# Patient Record
Sex: FEMALE | Race: WHITE | Employment: OTHER | URBAN - METROPOLITAN AREA
[De-identification: names, ages, dates, MRNs, and addresses within clinical notes are randomized per-mention and may not be internally consistent; named-entity substitution may affect disease eponyms.]

---

## 2017-01-04 ENCOUNTER — HOSPITAL ENCOUNTER (OUTPATIENT)
Dept: PHYSICAL THERAPY | Facility: MEDICAL CENTER | Age: 72
Discharge: HOME | End: 2017-01-31
Admitting: PHYSICAL MEDICINE & REHABILITATION

## 2017-02-08 ENCOUNTER — OFFICE VISIT (OUTPATIENT)
Dept: ORTHOPEDIC SURGERY | Age: 72
End: 2017-02-08

## 2017-02-08 VITALS — WEIGHT: 125.18 LBS | HEIGHT: 62 IN | BODY MASS INDEX: 23.04 KG/M2

## 2017-02-08 DIAGNOSIS — M25.511 BILATERAL SHOULDER PAIN, UNSPECIFIED CHRONICITY: ICD-10-CM

## 2017-02-08 DIAGNOSIS — M53.3 SI (SACROILIAC) JOINT DYSFUNCTION: Primary | ICD-10-CM

## 2017-02-08 DIAGNOSIS — M25.512 BILATERAL SHOULDER PAIN, UNSPECIFIED CHRONICITY: ICD-10-CM

## 2017-02-08 PROCEDURE — 99213 OFFICE O/P EST LOW 20 MIN: CPT | Performed by: PHYSICAL MEDICINE & REHABILITATION

## 2017-04-10 ENCOUNTER — OFFICE VISIT (OUTPATIENT)
Dept: ORTHOPEDIC SURGERY | Age: 72
End: 2017-04-10

## 2017-04-10 VITALS — WEIGHT: 127 LBS | HEIGHT: 62 IN | BODY MASS INDEX: 23.37 KG/M2

## 2017-04-10 DIAGNOSIS — M79.672 LEFT FOOT PAIN: Primary | ICD-10-CM

## 2017-04-10 DIAGNOSIS — M76.72 PERONEAL TENDINITIS, LEFT: ICD-10-CM

## 2017-04-10 PROBLEM — M76.70 PERONEAL TENDINITIS: Status: ACTIVE | Noted: 2017-04-10

## 2017-04-10 PROCEDURE — 73630 X-RAY EXAM OF FOOT: CPT | Performed by: PODIATRIST

## 2017-04-10 PROCEDURE — 99214 OFFICE O/P EST MOD 30 MIN: CPT | Performed by: PODIATRIST

## 2017-04-10 PROCEDURE — L3040 FT ARCH SUPRT PREMOLD LONGIT: HCPCS | Performed by: PODIATRIST

## 2017-05-01 ENCOUNTER — OFFICE VISIT (OUTPATIENT)
Dept: ORTHOPEDIC SURGERY | Age: 72
End: 2017-05-01

## 2017-05-01 VITALS
SYSTOLIC BLOOD PRESSURE: 103 MMHG | WEIGHT: 126.98 LBS | HEART RATE: 74 BPM | BODY MASS INDEX: 23.37 KG/M2 | HEIGHT: 62 IN | DIASTOLIC BLOOD PRESSURE: 60 MMHG

## 2017-05-01 DIAGNOSIS — M76.72 PERONEAL TENDINITIS, LEFT: Primary | ICD-10-CM

## 2017-05-01 PROCEDURE — 99213 OFFICE O/P EST LOW 20 MIN: CPT | Performed by: PODIATRIST

## 2017-05-01 RX ORDER — RALOXIFENE HYDROCHLORIDE 60 MG/1
TABLET, FILM COATED ORAL
COMMUNITY
Start: 2017-04-10

## 2017-05-01 RX ORDER — DEXAMETHASONE SODIUM PHOSPHATE 4 MG/ML
INJECTION, SOLUTION INTRA-ARTICULAR; INTRALESIONAL; INTRAMUSCULAR; INTRAVENOUS; SOFT TISSUE
Qty: 30 ML | Refills: 0 | Status: SHIPPED | OUTPATIENT
Start: 2017-05-01 | End: 2018-11-21 | Stop reason: ALTCHOICE

## 2017-05-05 ENCOUNTER — HOSPITAL ENCOUNTER (OUTPATIENT)
Dept: PHYSICAL THERAPY | Facility: MEDICAL CENTER | Age: 72
Discharge: HOME | End: 2017-05-31
Admitting: PODIATRIST

## 2017-06-05 ENCOUNTER — OFFICE VISIT (OUTPATIENT)
Dept: ORTHOPEDIC SURGERY | Age: 72
End: 2017-06-05

## 2017-06-05 VITALS
WEIGHT: 126.98 LBS | DIASTOLIC BLOOD PRESSURE: 64 MMHG | HEIGHT: 62 IN | SYSTOLIC BLOOD PRESSURE: 108 MMHG | BODY MASS INDEX: 23.37 KG/M2 | HEART RATE: 70 BPM

## 2017-06-05 DIAGNOSIS — M76.72 PERONEAL TENDINITIS, LEFT: Primary | ICD-10-CM

## 2017-06-05 PROCEDURE — 99213 OFFICE O/P EST LOW 20 MIN: CPT | Performed by: PODIATRIST

## 2018-06-04 ENCOUNTER — OFFICE VISIT (OUTPATIENT)
Dept: ORTHOPEDIC SURGERY | Age: 73
End: 2018-06-04

## 2018-06-04 VITALS — HEIGHT: 63 IN | BODY MASS INDEX: 21.97 KG/M2 | WEIGHT: 124 LBS

## 2018-06-04 DIAGNOSIS — M72.0 DUPUYTREN'S CONTRACTURE: ICD-10-CM

## 2018-06-04 DIAGNOSIS — M79.645 FINGER PAIN, LEFT: Primary | ICD-10-CM

## 2018-06-04 DIAGNOSIS — R22.32 FINGER MASS, LEFT: ICD-10-CM

## 2018-06-04 PROCEDURE — 99213 OFFICE O/P EST LOW 20 MIN: CPT | Performed by: ORTHOPAEDIC SURGERY

## 2018-06-04 RX ORDER — DOXYCYCLINE HYCLATE 20 MG
20 TABLET ORAL 2 TIMES DAILY
COMMUNITY
End: 2020-12-17 | Stop reason: ALTCHOICE

## 2018-07-13 ENCOUNTER — TELEPHONE (OUTPATIENT)
Dept: ORTHOPEDIC SURGERY | Age: 73
End: 2018-07-13

## 2018-07-13 NOTE — TELEPHONE ENCOUNTER
Auth: NPR  Date: 7/26/18  Reference # None  Type of SX: Outpatient  Location: [facility]  CPT [id number]   SX area:  hand

## 2018-08-09 ENCOUNTER — OFFICE VISIT (OUTPATIENT)
Dept: ORTHOPEDIC SURGERY | Age: 73
End: 2018-08-09

## 2018-08-09 DIAGNOSIS — R22.32 FINGER MASS, LEFT: Primary | ICD-10-CM

## 2018-08-09 DIAGNOSIS — M72.0 DUPUYTREN'S CONTRACTURE: ICD-10-CM

## 2018-08-09 PROCEDURE — 99213 OFFICE O/P EST LOW 20 MIN: CPT | Performed by: ORTHOPAEDIC SURGERY

## 2018-08-09 NOTE — PROGRESS NOTES
Assessment: Dupuytren's contracture of the contralateral right hand which is going to need a Xiaflex injection for MP contracture as well as Y cord to ring and small fingers. The majority of her contracture though is about a 15° MP contracture of the ring finger. Treatment Plan: Informed consent for Xiaflex injection was obtained today. The insurance company has authorized the injection and she is going to wait till a more appropriate time for doing it. We also removed her sutures from her contralateral hand    Return for Xiaflex injection. History of Present Illness  Rosita Vasquez is a 81 y.o. female. Susanna Muniz presents today for scheduling for Xiaflex injection and informed consent. She also is postop from her contralateral left hand removal of a fibrous mass at the DIP joint level    Review of Systems  Pertinent items are noted in HPI  Complete Review of Systems reviewed from patient history form dated 2018 and available in the patients chart under the media tab. Vital Signs  There were no vitals filed for this visit. There is no height or weight on file to calculate BMI. Physical Exam  Constitutional:  Patient is well-nourished and demonstrates normal hygiene. Mental Status:  Patient is alert and oriented to person, place and time. Skin:  Intact, no rashes or lesions. Hand Examination: Left hand well-healed incision. Right hand pretendinous cord to the ring finger with 15° MP contracture 1 cords going to ring and small fingers.   When she extends her fingers fully she can't extend her wrist completely due to the palmar contracture    Additional Comments:     Additional Examinations:  X-Ray Findings:    Additional Diagnostic Test Findings:    Office Procedures: Risks and benefits of Xiaflex injection were discussed today including the remote possibility of tendon rupture, the incidence of skin tears, the incidence of lymphadenopathy, hematoma and swelling as well as recurrence of Dupuytren's    No orders of the defined types were placed in this encounter.

## 2018-08-29 ENCOUNTER — OFFICE VISIT (OUTPATIENT)
Dept: ORTHOPEDIC SURGERY | Age: 73
End: 2018-08-29

## 2018-08-29 VITALS — WEIGHT: 123 LBS | HEIGHT: 62 IN | BODY MASS INDEX: 22.63 KG/M2

## 2018-08-29 DIAGNOSIS — M72.0 DUPUYTREN'S CONTRACTURE: Primary | ICD-10-CM

## 2018-08-29 PROCEDURE — 20527 NJX NZM PALMAR FASCIAL CORD: CPT | Performed by: ORTHOPAEDIC SURGERY

## 2018-08-29 NOTE — PROGRESS NOTES
Injection adminstration details:  Date & Time: 8/29/18 10:11 AM  Site & Comments:Right ring finger Xiaflex injection administered by Dr. Meliza Matt 0.9mg per vial  NDC:  86672-423-58  Lot Number: [id number]  EXP: 07/2020    Medication supplied by [facility]- purchased at [employer]

## 2018-08-29 NOTE — PROGRESS NOTES
Assessment: Xiaflex injection. Majority of the injection was given into the Y cord at the ring finger MP joint but there is a Y cord to the middle finger which was injected as well. We then gave the remainder of the injection more proximally where she has a fairly substantial pretendinous cord to the ring finger. Total of 0.3 mL was injected    Treatment Plan: We discussed prior to the injection wrist and benefits of Xiaflex injection including lymphadenopathy swelling hematoma and skin tears. We also talked about the recurrence of Dupuytren's    No Follow-up on file. History of Present Illness  Fiona Cummings is a 81 y.o. female. Patient comes in today for Xiaflex injection    Review of Systems  Pertinent items are noted in HPI  Denies fever chills, confusion and bowel and bladder active change  Complete Review of Systems reviewed from patient history form dated 2018 and available in the patients chart under the media tab. Vital Signs  Vitals:    08/29/18 0949   Weight: 123 lb (55.8 kg)   Height: 5' 2.25\" (1.581 m)     Body mass index is 22.32 kg/m². Medical History  Past Medical History:   Diagnosis Date    Dizziness     Joint pain      Current Outpatient Prescriptions on File Prior to Visit   Medication Sig Dispense Refill    doxycycline hyclate (PERIOSTAT) 20 MG tablet Take 20 mg by mouth 2 times daily      raloxifene (EVISTA) 60 MG tablet       dexamethasone (DECADRON) 4 MG/ML injection Apply via iontophoresis with physical therapy 30 mL 0    Pediatric Multivitamins-Fl (MULTI VIT/FL PO) Take  by mouth.  aspirin 81 MG chewable tablet Take 81 mg by mouth daily.  CycloSPORINE (RESTASIS OP) Apply  to eye.  ValACYclovir HCl (VALTREX PO) Take  by mouth.  Raloxifene HCl (EVISTA PO) Take  by mouth. No current facility-administered medications on file prior to visit.       No Known Allergies  [unfilled]    Family History   Problem Relation Age of Onset    Heart Disease Father     Cancer Sister     Diabetes Paternal Aunt     Heart Disease Maternal Grandfather     Diabetes Paternal Grandfather     Heart Disease Paternal Grandfather        Physical Exam  Constitutional:  Patient is well-nourished and demonstrates normal hygiene. Mental Status:  Patient is alert and oriented to person, place and time. Skin:  Intact, no rashes or lesions.     Hand Examination: Prior to injection she has a pretendinous cord to the ring finger causing about a 10-15° MP contracture of the ring finger and about a 5° contracture of the middle finger    Additional Comments:     Additional Examinations:  X-Ray Findings:    Additional Diagnostic Test Findings:    Office Procedures: After discussing the procedure with the patient we sterilely prepped the right hand I then injected 0.2 mL of Xiaflex into the Y cord and 0.1 mL into the pretendinous cord proximally at the ring finger level        Orders Placed This Encounter   Procedures    MA INJECTION ENZYME PALMAR FASCIAL CORD     Xiaflex injection Right ring finger

## 2018-08-30 ENCOUNTER — OFFICE VISIT (OUTPATIENT)
Dept: ORTHOPEDIC SURGERY | Age: 73
End: 2018-08-30

## 2018-08-30 DIAGNOSIS — M72.0 DUPUYTREN'S CONTRACTURE: Primary | ICD-10-CM

## 2018-08-30 PROCEDURE — 26341 MANIPULAT PALM CORD POST INJ: CPT | Performed by: ORTHOPAEDIC SURGERY

## 2018-08-30 RX ORDER — HYDROCODONE BITARTRATE AND ACETAMINOPHEN 5; 325 MG/1; MG/1
1 TABLET ORAL EVERY 6 HOURS PRN
Qty: 10 TABLET | Refills: 0 | Status: SHIPPED | OUTPATIENT
Start: 2018-08-30 | End: 2018-09-02

## 2018-08-30 NOTE — PROGRESS NOTES
Physical Exam  Constitutional:  Patient is well-nourished and demonstrates normal hygiene. Mental Status:  Patient is alert and oriented to person, place and time. Skin:  Intact, no rashes or lesions. Hand Examination: Prior to the [place] she had a 10-15° MP contracture of the ring finger and about a 5-10° MP contracture of the middle finger. After Xiaflex manipulation we had full hyperextension with no restriction of all digits    Additional Comments:     Additional Examinations:  X-Ray Findings:    Additional Diagnostic Test Findings:    Office Procedures: After discussing the procedure with the patient again today I anesthetized the hand using a total of 10 mL of a 50% mixture of betamethasone and Xylocaine. We then manipulated the Xiaflex cords and were able to rupture all cords giving her full extension. No skin tears occurred there was one small blood blister distally she only has moderate swelling    I spent 15 minutes, face to face, with the patient discussing treatment options and answering questions regarding . ..     Orders Placed This Encounter   Procedures    [facility] [place] [facility]     Referral Priority:   Routine     Referral Type:   Eval and Treat     Referral Reason:   Specialty Services Required     Requested Specialty:   Occupational Therapy     Number of Visits Requested:   1    NE MANIPLATN PALAR FASCIAL CRD POST INJ SINGLE CORD

## 2018-09-20 ENCOUNTER — OFFICE VISIT (OUTPATIENT)
Dept: ORTHOPEDIC SURGERY | Age: 73
End: 2018-09-20

## 2018-09-20 VITALS — BODY MASS INDEX: 21.97 KG/M2 | WEIGHT: 124 LBS | HEIGHT: 63 IN

## 2018-09-20 DIAGNOSIS — M72.0 DUPUYTREN'S CONTRACTURE: Primary | ICD-10-CM

## 2018-09-20 PROCEDURE — 99212 OFFICE O/P EST SF 10 MIN: CPT | Performed by: ORTHOPAEDIC SURGERY

## 2018-09-20 NOTE — PROGRESS NOTES
Assessment: Excellent result from Xiaflex injection of the right ring finger with full extension    Treatment Plan: We discussed doing scar massage at home continued night splinting. Return if symptoms worsen or fail to improve. History of Present Illness  Flori Armas is a 81 y.o. female. Noah Avila is doing very well after Xiaflex rupture of the flexion contracture the right ring finger. She did have a lot of swelling and pain initially after her injection but now is doing well    Review of Systems  Pertinent items are noted in HPI  Denies fever chills, confusion and bowel and bladder active change  Complete Review of Systems reviewed from patient history form dated 2018 and available in the patients chart under the media tab. Vital Signs  Vitals:    09/20/18 0959   Weight: 124 lb (56.2 kg)   Height: 5' 2.75\" (1.594 m)     Body mass index is 22.14 kg/m². Medical History  Past Medical History:   Diagnosis Date    Dizziness     Joint pain      Current Outpatient Prescriptions on File Prior to Visit   Medication Sig Dispense Refill    collagenase (XIAFLEX) 0.9 MG SOLR injection 0.9 mg by Intra-Lesional route once for 1 dose 0.9 mg 0    doxycycline hyclate (PERIOSTAT) 20 MG tablet Take 20 mg by mouth 2 times daily      raloxifene (EVISTA) 60 MG tablet       dexamethasone (DECADRON) 4 MG/ML injection Apply via iontophoresis with physical therapy 30 mL 0    Pediatric Multivitamins-Fl (MULTI VIT/FL PO) Take  by mouth.  aspirin 81 MG chewable tablet Take 81 mg by mouth daily.  CycloSPORINE (RESTASIS OP) Apply  to eye.  ValACYclovir HCl (VALTREX PO) Take  by mouth.  Raloxifene HCl (EVISTA PO) Take  by mouth. No current facility-administered medications on file prior to visit.       No Known Allergies  [unfilled]  Family History   Problem Relation Age of Onset    Heart Disease Father     Cancer Sister     Diabetes Paternal Aunt     Heart Disease Maternal Grandfather

## 2018-11-21 ENCOUNTER — OFFICE VISIT (OUTPATIENT)
Dept: ORTHOPEDIC SURGERY | Age: 73
End: 2018-11-21
Payer: MEDICARE

## 2018-11-21 VITALS — WEIGHT: 123.9 LBS | BODY MASS INDEX: 21.95 KG/M2 | HEIGHT: 63 IN

## 2018-11-21 DIAGNOSIS — M25.562 LEFT KNEE PAIN, UNSPECIFIED CHRONICITY: Primary | ICD-10-CM

## 2018-11-21 DIAGNOSIS — M17.0 ARTHRITIS OF BOTH KNEES: ICD-10-CM

## 2018-11-21 DIAGNOSIS — M25.561 RIGHT KNEE PAIN, UNSPECIFIED CHRONICITY: ICD-10-CM

## 2018-11-21 PROCEDURE — 99214 OFFICE O/P EST MOD 30 MIN: CPT | Performed by: ORTHOPAEDIC SURGERY

## 2019-02-25 ENCOUNTER — OFFICE VISIT (OUTPATIENT)
Dept: ORTHOPEDIC SURGERY | Age: 74
End: 2019-02-25
Payer: MEDICARE

## 2019-02-25 VITALS — HEIGHT: 63 IN | BODY MASS INDEX: 21.95 KG/M2 | WEIGHT: 123.9 LBS

## 2019-02-25 DIAGNOSIS — M25.521 RIGHT ELBOW PAIN: Primary | ICD-10-CM

## 2019-02-25 DIAGNOSIS — M77.11 RIGHT LATERAL EPICONDYLITIS: ICD-10-CM

## 2019-02-25 PROCEDURE — MISCD85 PADDED ELBOW SLEEVE-BREG: Performed by: ORTHOPAEDIC SURGERY

## 2019-02-25 PROCEDURE — 99213 OFFICE O/P EST LOW 20 MIN: CPT | Performed by: ORTHOPAEDIC SURGERY

## 2019-03-04 ENCOUNTER — APPOINTMENT (OUTPATIENT)
Dept: OCCUPATIONAL THERAPY | Age: 74
End: 2019-03-04
Payer: MEDICARE

## 2019-03-05 ENCOUNTER — HOSPITAL ENCOUNTER (OUTPATIENT)
Dept: OCCUPATIONAL THERAPY | Age: 74
Setting detail: THERAPIES SERIES
Discharge: HOME | End: 2019-03-05
Payer: MEDICARE

## 2019-03-05 PROCEDURE — G8984 CARRY CURRENT STATUS: HCPCS | Performed by: OCCUPATIONAL THERAPIST

## 2019-03-05 PROCEDURE — G8985 CARRY GOAL STATUS: HCPCS | Performed by: OCCUPATIONAL THERAPIST

## 2019-03-05 PROCEDURE — 97165 OT EVAL LOW COMPLEX 30 MIN: CPT | Performed by: OCCUPATIONAL THERAPIST

## 2019-03-05 PROCEDURE — 97535 SELF CARE MNGMENT TRAINING: CPT | Performed by: OCCUPATIONAL THERAPIST

## 2019-03-05 PROCEDURE — 97140 MANUAL THERAPY 1/> REGIONS: CPT | Performed by: OCCUPATIONAL THERAPIST

## 2019-03-05 PROCEDURE — 97110 THERAPEUTIC EXERCISES: CPT | Performed by: OCCUPATIONAL THERAPIST

## 2019-03-20 ENCOUNTER — HOSPITAL ENCOUNTER (OUTPATIENT)
Dept: OCCUPATIONAL THERAPY | Age: 74
Setting detail: THERAPIES SERIES
Discharge: HOME | End: 2019-03-20
Payer: MEDICARE

## 2019-03-20 PROCEDURE — 97140 MANUAL THERAPY 1/> REGIONS: CPT | Performed by: OCCUPATIONAL THERAPIST

## 2019-03-20 PROCEDURE — 97110 THERAPEUTIC EXERCISES: CPT | Performed by: OCCUPATIONAL THERAPIST

## 2019-03-20 PROCEDURE — 97035 APP MDLTY 1+ULTRASOUND EA 15: CPT | Performed by: OCCUPATIONAL THERAPIST

## 2019-03-20 PROCEDURE — 97112 NEUROMUSCULAR REEDUCATION: CPT | Performed by: OCCUPATIONAL THERAPIST

## 2019-03-28 ENCOUNTER — HOSPITAL ENCOUNTER (OUTPATIENT)
Dept: OCCUPATIONAL THERAPY | Age: 74
Setting detail: THERAPIES SERIES
Discharge: HOME | End: 2019-03-28
Payer: MEDICARE

## 2019-03-28 PROCEDURE — 97110 THERAPEUTIC EXERCISES: CPT | Performed by: OCCUPATIONAL THERAPIST

## 2019-03-28 PROCEDURE — 97035 APP MDLTY 1+ULTRASOUND EA 15: CPT | Performed by: OCCUPATIONAL THERAPIST

## 2019-03-28 PROCEDURE — 97140 MANUAL THERAPY 1/> REGIONS: CPT | Performed by: OCCUPATIONAL THERAPIST

## 2019-04-03 ENCOUNTER — OFFICE VISIT (OUTPATIENT)
Dept: ORTHOPEDIC SURGERY | Age: 74
End: 2019-04-03
Payer: MEDICARE

## 2019-04-03 VITALS
WEIGHT: 123.9 LBS | BODY MASS INDEX: 21.95 KG/M2 | HEIGHT: 63 IN | SYSTOLIC BLOOD PRESSURE: 122 MMHG | DIASTOLIC BLOOD PRESSURE: 61 MMHG | HEART RATE: 65 BPM

## 2019-04-03 DIAGNOSIS — M54.50 ACUTE LOW BACK PAIN WITHOUT SCIATICA, UNSPECIFIED BACK PAIN LATERALITY: Primary | ICD-10-CM

## 2019-04-03 DIAGNOSIS — M51.36 DDD (DEGENERATIVE DISC DISEASE), LUMBAR: ICD-10-CM

## 2019-04-03 PROCEDURE — 99214 OFFICE O/P EST MOD 30 MIN: CPT | Performed by: PHYSICAL MEDICINE & REHABILITATION

## 2019-04-03 NOTE — PROGRESS NOTES
tenderness bilaterally at the paraspinal or trochanters. There is no step-off or paraspinal spasm. · Range of Motion:  95% normal flexion extension  · Strength:   Strength testing is 5/5 in all muscle groups tested. · Special Tests:   Straight leg raise and crossed SLR negative. Leg length and pelvis level.  0 out of 5 Mayelin's signs. · Skin: There are no rashes, ulcerations or lesions. · Reflexes: Reflexes are symmetrically 2+ at the patellar and ankle tendons. Clonus absent bilaterally at the feet. · Gait & station: Normal gait  · Additional Examinations:   · RIGHT LOWER EXTREMITY: Inspection/examination of the right lower extremity does not show any tenderness, deformity or injury. Range of motion is full. There is no gross instability. There are no rashes, ulcerations or lesions. Strength and tone are normal.  ·   · LEFT LOWER EXTREMITY:  Inspection/examination of the left lower extremity does not show any tenderness, deformity or injury. Range of motion is full. There is no gross instability. There are no rashes, ulcerations or lesions. Strength and tone are normal.    Diagnostic Testin CBC in general chemistries are normal    April 3, 2019 2 views lumbar spine AP and lateral show DDD L5-S1 with L5-S1 facet arthropathy, overall unchanged from 2016 x-rays    Impression:    Subacute right mechanical back pain  Lumbar DDD and spondylosis  Osteopenia    Plan:     And think she has aggravation of lumbar DDD and spondylosis. She is already improving.     Recommending short course of physical therapy for core strengthening and ergonomics    SHARDA Chau

## 2019-04-04 ENCOUNTER — HOSPITAL ENCOUNTER (OUTPATIENT)
Dept: OCCUPATIONAL THERAPY | Age: 74
Setting detail: THERAPIES SERIES
Discharge: HOME | End: 2019-04-04
Payer: MEDICARE

## 2019-04-04 PROCEDURE — 97035 APP MDLTY 1+ULTRASOUND EA 15: CPT | Performed by: OCCUPATIONAL THERAPIST

## 2019-04-04 PROCEDURE — 97140 MANUAL THERAPY 1/> REGIONS: CPT | Performed by: OCCUPATIONAL THERAPIST

## 2019-04-04 PROCEDURE — 97110 THERAPEUTIC EXERCISES: CPT | Performed by: OCCUPATIONAL THERAPIST

## 2019-04-04 NOTE — FLOWSHEET NOTE
[facility] [address] [facility]   [address], [address]  Phone: [phone] Fax: [phone]      Hand Therapy Daily Treatment Note  Date:  2019    Patient: Yusef Andujar   : 1945   MRN: [id number]  Referring Physician: Referring Practitioner: Isaiah Pearson MD       Medical Diagnosis Information:  Diagnosis: M77.11 (ICD-10-CM) - Right lateral epicondylitis    Treatment Diagnosis: M25.521 (ICD-10-CM) - Right elbow pain                                         Insurance information: OT Insurance Information: [employer]  Date of Injury:NA  Date of Surgery:NA      Visit # Insurance Allowable   4 BMN     Date of Patient follow up with Physician: 19    G-Codes:  OT G-codes  Functional Assessment Tool Used: Quick DASH  Score: 48%  Functional Limitation: Carrying, moving and handling objects  Carrying, Moving and Handling Objects Current Status (): At least 40 percent but less than 60 percent impaired, limited or restricted  Carrying, Moving and Handling Objects Goal Status (): At least 20 percent but less than 40 percent impaired, limited or restricted    Progress Note: []  Yes  [x]  No  Next due by: Visit #10      Latex Allergy:  [x]NO      []YES            Pacemaker:  [x] No       [] Yes      Preferred Language for Healthcare:   [x]English       []other:    SUBJECTIVE: Pain continues to improve overall, however did feel more soreness over length of R UE after increased gardening/yardwork this weekend. Also reports having been seen by another physician recently due to back pain and referred for PT.      Background/Relevant Medical & Therapy History: progressive pain since last /winter 2018; possibly due to snow removal; reports increased pain with knitting, lifting, carrying, stretching arm, opening jars       Pain level:  0/10 at rest, 2-3/10 at times, \"mild\"      RESTRICTIONS/PRECAUTIONS:     OBJECTIVE:       Date: 3/5/2019 3/20/19 3/28/19 4/4/19    Objective Measures:        Wrist ext/flex            Rd/ud  FA sup/pron  Elbow ext/flex       65/80    80/85  0/140 (\"stiff\")   WNL     II  Lateral Pinch  3 Point Pinch  MMT: wrist ext   30 with pain  11  11  4+/5 with pain   20  Right - 50  Left -  R 52#      5/5 wrist ext/flex               Modalities:        US, cont, 1MHz, 1.2wcm2, lateral elbow/FA - 8' same 8'                    Therapeutic Exercise, Activities, NMR:        AROM 5x elbow AROM    Shoulder circles x 10     Shoulder/  scapular circles x 10 (cueing needed for technique)   Shoulder/  scapular circles x 10    Shoulder circles x 5    Composite Stretching 10x wrist ext, flex - shoulder down technique 10x2 (cueing for technique - passive stretching vs active performance) foreamrm stretching 10x2 composite stretching into power web    Conditioning Eccentric wrist ext x 10 (1#), concentric wrist flex x 10 (1#) 1#, 10x2 each as prior    Therabar, yellow, B sup/pron (neutral wrist, submax ) x 10 each, R pron x 10 2# eccentric wrist extension 10 x 2, concentric wrist flexion 10 x 2, 2 lbs. 2# eccentric wrist ext x 10, concentric wrist ext, flex x 10 each    Proximal scapular stabilization  10x \"supermans\" (modified position 90/90 due to hx of shoulder irritation)    10x each HAB thumbs up/down  Thoracic stabilization exercises in prone three positions x 15 each.  Instructed on B UE light resistive shoulder, scapular, elbow exercises (cueing for submaximal , neutral wrist)- 10x2 each B shoulder ext, triceps/elbow ext, biceps/elbow flex, rowing     ADL Retraining Instructed on diagnosis specific anatomy, joint protection, and ADL modifications - resource information provided Reviewed techniques; demonstrated alternative wrist/hand posturing for use of spray bottle & considerations for alternative sprayer/brand for increased ease  Reviewed lifting, holding techniques    Corner Stretch X 5 x5  x5 Therapeutic Exercise and NMR:  [x] (96645) Provided verbal/tactile cueing for activities related to strengthening, flexibility, endurance, ROM  for improvements in scapular, scapulothoracic and UE control with self care, reaching, carrying, lifting, house/yardwork, driving/computer work. [x] (48774) Provided verbal/tactile cueing for activities related to improving balance, coordination, kinesthetic sense, posture, motor skill, proprioception  to assist with  scapular, scapulothoracic and UE control with self care, reaching, carrying, lifting, house/yardwork, driving/computer work.   [] Comments:    Therapeutic Activities:    [] (61480 or 53015) Provided verbal/tactile cueing for activities related to improving balance, coordination, kinesthetic sense, posture, motor skill, proprioception and motor activation to allow for proper function of scapular, scapulothoracic and UE control with self care, carrying, lifting, driving/computer work  [] Comments:    Home Exercise Program:    [x] (41797) Reviewed/Progressed HEP activities related to strengthening, flexibility, endurance, ROM of scapular, scapulothoracic and UE control with self care, reaching, carrying, lifting, house/yardwork, driving/computer work  [x] (32964) Reviewed/Progressed HEP activities related to improving balance, coordination, kinesthetic sense, posture, motor skill, proprioception of scapular, scapulothoracic and UE control with self care, reaching, carrying, lifting, house/yardwork, driving/computer work    [x] Comments:instructed on strengthening sheet    Manual Treatments:  PROM / STM / Oscillations-Mobs:  G-I, II, III, IV (PA's, Inf., Post.)  [x] (57475) Provided manual therapy to mobilize soft tissue/joints of cervical/CT, scapular GHJ and UE for the purpose of modulating pain, promoting relaxation,  increasing ROM, reducing/eliminating soft tissue swelling/inflammation/restriction, improving soft tissue extensibility and allowing for proper ROM for normal function with self care, reaching, carrying, lifting, house/yardwork, driving/computer work  [x] Comments: 8' STM, DTM, CFM    ADL Training:  [x]  (96934) Provided self-care/home management training related to activities of daily living and compensatory training, and/or use of adaptive equipment   [x] Comments:reviewed diagnosis specific anatomy, joint protection, and ADL modifications     Splinting:  [] Fabrication of:   [] (19818) Orthotic/Prosthetic Management, subsequent encounter  [] (71264) Orthotic management and training (fitting and assessment)  [] Comments:    Charges:  Timed Code Treatment Minutes: 54   Total Treatment Minutes: 60     [] EVAL (LOW) 00555   [] OT Re-eval (98659)  [] EVAL (MOD) 99317   [] EVAL (HIGH) 12508       [x] Giselle (97810) x  2   [] RHBFN(39324)  [] NMR (67837) x      [] Estim (attended) (18266)   [x] Manual ([id number]) x  1    [x] US (88412)   [] TA (08954) x      [] Paraffin (93805)  [] ADL  ([phone]) x     [] Splint/L code:    [] Estim (unattended) (22 [id number])  [] Fluidotherapy (50494)  [] Other:    GOALS:  Short Term Goals: To be achieved in: 2 weeks  1. Independent in HEP and progression per patient tolerance, in order to prevent re-injury. 2. Patient will have a decrease in pain to facilitate improvement in movement, function, and ADLs as indicated by Functional Deficits.     Long Term Goals to be achieved in 4-6  weeks, including patient directed goals to address identified performance deficits:  1) Pt to be independent in graded HEP progression with a good level of effort and compliance. 2) Pt to report a score of 30 % or less on the Quick DASH disability questionnaire for increased performance with carrying, moving, and handling objects.   3) Pt will demonstrate increased painfree full AROM (0/140) R elbow for improved independence with reaching into cabinets  4) Pt will demonstrate increased strength to R  >/= 75% of L for increased independence with opening dequan. 5) Pt will have a decrease in pain to 1-2/10 to enable return to knitting. 6)    7)       Progression Towards Functional goals:  [x] Patient is progressing as expected towards functional goals listed. [] Progression is slowed due to complexities listed. [] Progression has been slowed due to co-morbidities. [] Plan just implemented, too soon to assess goals progression  [] All goals are met  [] Other:     ASSESSMENT:   Strength and function increasing, pain decreasing  Treatment/Activity Tolerance:  [x] Patient tolerated treatment well [] Patient limited by fatique  [] Patient limited by pain   [] Patient limited by other medical complications  [] Other:     Prognosis: [x] Good [] Fair  [] Poor    Patient Requires Follow-up: [x] Yes  [] No    PLAN: [x] Continue per plan of care [] Alter current plan (see comments)  [] Plan of care initiated [] Hold pending MD visit [] Discharge    If patient does not return for further follow ups after this date, please consider this as the patient's discharge from occupational therapy. Electronically signed by:  Andrew Gill OTR/L, PT, MPT, [address] [place] [address], [id number], [id number]

## 2019-04-10 ENCOUNTER — APPOINTMENT (OUTPATIENT)
Dept: OCCUPATIONAL THERAPY | Age: 74
End: 2019-04-10
Payer: MEDICARE

## 2019-04-10 ENCOUNTER — HOSPITAL ENCOUNTER (OUTPATIENT)
Dept: PHYSICAL THERAPY | Age: 74
Setting detail: THERAPIES SERIES
Discharge: HOME | End: 2019-04-10
Payer: MEDICARE

## 2019-04-10 ENCOUNTER — HOSPITAL ENCOUNTER (OUTPATIENT)
Dept: OCCUPATIONAL THERAPY | Age: 74
Setting detail: THERAPIES SERIES
Discharge: HOME | End: 2019-04-10
Payer: MEDICARE

## 2019-04-10 PROCEDURE — G8985 CARRY GOAL STATUS: HCPCS | Performed by: OCCUPATIONAL THERAPIST

## 2019-04-10 PROCEDURE — 97112 NEUROMUSCULAR REEDUCATION: CPT | Performed by: OCCUPATIONAL THERAPIST

## 2019-04-10 PROCEDURE — 97110 THERAPEUTIC EXERCISES: CPT | Performed by: PHYSICAL THERAPIST

## 2019-04-10 PROCEDURE — 97110 THERAPEUTIC EXERCISES: CPT | Performed by: OCCUPATIONAL THERAPIST

## 2019-04-10 PROCEDURE — G8984 CARRY CURRENT STATUS: HCPCS | Performed by: OCCUPATIONAL THERAPIST

## 2019-04-10 PROCEDURE — 97035 APP MDLTY 1+ULTRASOUND EA 15: CPT | Performed by: OCCUPATIONAL THERAPIST

## 2019-04-10 PROCEDURE — 97140 MANUAL THERAPY 1/> REGIONS: CPT | Performed by: OCCUPATIONAL THERAPIST

## 2019-04-10 PROCEDURE — 97161 PT EVAL LOW COMPLEX 20 MIN: CPT | Performed by: PHYSICAL THERAPIST

## 2019-04-10 NOTE — FLOWSHEET NOTE
[facility] [address] [place] [address] [facility]    [facility] Therapy Daily Treatment Note  Date:  4/10/2019    Patient Name:  Andrew Leos    :  1945  MRN: [id number]  Restrictions/Precautions:    Medical/Treatment Diagnosis Information:  · Diagnosis: Low back pain, lumbar DDD   M54.5  · Treatment Diagnosis: PT treatment diagnosis:  low back pain  Insurance/Certification information:  PT Insurance Information: [employer]  visits based on medical necessity,  $0 copay  Physician Information:  Referring Practitioner: Dr Alejandro Zepeda of care signed (Y/N):     Date of Patient follow up with Physician:     G-Code (if applicable):      Date G-Code Applied:    PT G-Codes  Functional Assessment Tool Used: Modified TOOTIE  Score: 8%    Progress Note: []  Yes  []  No  Next due by: Visit #10      Latex Allergy:  [x]NO      []YES  Preferred Language for Healthcare:   [x]English       []other:    Visit # Insurance Allowable   1 BMN       Auth Required   []  Yes    [] No    Visits Approved  Date Ranged-       Pain level:  0/10     SUBJECTIVE:  See eval    OBJECTIVE: See eval  Observation:   Test measurements:      RESTRICTIONS/PRECAUTIONS: Osteopenia    Exercises/Interventions:       Script:  19  Exercise/Equipment Sets/Reps Notes Last Progression   Hand Knee Rock      Prone Press Up      LTR w/ crossed legs 3x30'' B     Piriformis Cross Over/Figure 4 piriformis Stretch      SKC      DKC      Prone Quad Stretch       Hamstring Stretch 3x30'' B tableside    Quadruped UE      Quadruped LE      Quadruped UE/LE combined (birddog)       TA / Oly Ditch / Abd Hollow      TA March      Bridge 2x10     SLR Flex      SLR Abd 2x10 B     Prone hip ext 2x10 B     Clamshells      Prone plank      Side plank      Squats      Standing Stretch (insert muscle)                        Education 5'           Therapeutic Exercise and NMR EXR  [] (79494) Provided verbal/tactile cueing for activities related to strengthening, flexibility, endurance, ROM  for improvements in proximal hip and core control with self care, mobility, lifting and ambulation.  [] (59995) Provided verbal/tactile cueing for activities related to improving balance, coordination, kinesthetic sense, posture, motor skill, proprioception  to assist with core control in self care, mobility, lifting, and ambulation. Therapeutic Activities:    [] (73098 or 40893) Provided verbal/tactile cueing for activities related to improving balance, coordination, kinesthetic sense, posture, motor skill, proprioception and motor activation to allow for proper function  with self care and ADLs  [] (50785) Provided training and instruction to the patient for proper core and proximal hip recruitment and positioning with ambulation re-education     Home Exercise Program:    [x] (60066) Reviewed/Progressed HEP activities related to strengthening, flexibility, endurance, ROM of core, proximal hip and LE for functional self-care, mobility, lifting and ambulation   [] (41302) Reviewed/Progressed HEP activities related to improving balance, coordination, kinesthetic sense, posture, motor skill, proprioception of core, proximal hip and LE for self care, mobility, lifting, and ambulation      Manual Treatments: Traction / PA's (Gr I, II, III, IV) / Stretch- Hamstring, Piriformis, Hip Flexor, Groin / STM/ Sacral Decompression  [x] Provided manual therapy to mobilize soft tissue/joints for the purpose of modulating pain, promoting relaxation, increasing ROM, reducing/eliminating soft tissue swelling/inflammation/restriction, improving soft tissue extensibility and allowing for proper ROM for normal function. (01703).      Modalities:       Charges:   Timed Code Treatment Minutes: 25   Total Treatment Minutes: 50     [x] EVAL (LOW) 40963 (typically 20 minutes face-to-face)  [] EVAL (MOD) 30044 (typically 30 minutes face-to-face)  [] EVAL (HIGH) 93410 (typically 45 minutes face-to-face)  [] RE-EVAL     [x] SO(96700) x  2   [] IONTO  [] NMR (36890) x      [] VASO  [] Manual (43575) x       [] Other:  [] TA x       [] Mech Traction (20435)  [] ES(attended) (01987)      [] ES (un) (27168):     Goals:   Short Term Goals: To be achieved in: 2 weeks  1. Independent in HEP and progression per patient tolerance, in order to prevent re-injury. 2. Patient will have a decrease in pain to facilitate improvement in movement, function, and ADLs as indicated by Functional Deficits. Long Term Goals: To be achieved in: 4 weeks  1. Disability index score of 0% for the TOOTIE to assist with reaching prior level of function. 2. Patient will demonstrate increased AROM to WNL, good LS mobility, good hip ROM to allow for proper joint functioning as indicated by patients Functional Deficits. 3. Patient will demonstrate an increase in Strength to good proximal hip and core activation to allow for proper functional mobility as indicated by patients Functional Deficits. 4. Patient will return to bending forward to put on shoes, get in/out of car, functional activities without increased symptoms or restriction. Progression Towards Functional goals:  [] Patient is progressing as expected towards functional goals listed. [] Progression is slowed due to complexities listed. [] Progression has been slowed due to co-morbidities.   [x] Plan just implemented, too soon to assess goals progression  [] Other:     ASSESSMENT:  See eval    Treatment/Activity Tolerance:  [x] Patient tolerated treatment well [] Patient limited by fatique  [] Patient limited by pain  [] Patient limited by other medical complications  [] Other:     Prognosis: [x] Good [] Fair  [] Poor    Patient Requires Follow-up: [x] Yes  [] No    PLAN FOR NEXT SESSION:     PLAN: See eval  [] Continue per plan of care [] Alter current plan (see comments)  [x] Plan of care initiated [] Hold pending MD visit [] Discharge    *If patient does not return for further follow ups after this date. Please consider this as the patients discharge from physical therapy.      Electronically signed by: Jovita Aggarwal PT [id number]

## 2019-04-10 NOTE — FLOWSHEET NOTE
[facility]  [address], [address] [facility], [address] [facility]  Phone: [phone] Fax: [phone]      Hand Therapy Daily Treatment Note  Date:  4/10/2019    Patient: Carleton Aschoff   : 1945   MRN: [id number]  Referring Physician: Referring Practitioner: Jacob Christianson MD       Medical Diagnosis Information:  Diagnosis: M77.11 (ICD-10-CM) - Right lateral epicondylitis    Treatment Diagnosis: M25.521 (ICD-10-CM) - Right elbow pain                                         Insurance information: OT Insurance Information: [employer]  Date of Injury:NA  Date of Surgery:NA      Visit # Insurance Allowable   5 BMN     Date of Patient follow up with Physician: 19    G-Codes:  OT G-codes  Functional Assessment Tool Used: Quick DASH  Score: 16%  Functional Limitation: Carrying, moving and handling objects  Carrying, Moving and Handling Objects Current Status (): At least 1 percent but less than 20 percent impaired, limited or restricted  Carrying, Moving and Handling Objects Goal Status ():  At least 20 percent but less than 40 percent impaired, limited or restricted    Progress Note: []  Yes  [x]  No  Next due by: Visit #10      Latex Allergy:  [x]NO      []YES            Pacemaker:  [x] No       [] Yes      Preferred Language for Healthcare:   [x]English       []other:    SUBJECTIVE: overall continues to feel improvement; starting PT today for back pain; requesting review of exercise technique to ensure proper form     Background/Relevant Medical & Therapy History: progressive pain since last /winter 2018; possibly due to snow removal; reports increased pain with knitting, lifting, carrying, stretching arm, opening jars       Pain level:  0/10 at rest, 2-3/10 at times, \"mild\"      RESTRICTIONS/PRECAUTIONS:     OBJECTIVE:       Date:  3/5/2019 3/20/19 3/28/19 4/4/19 4/10/19   Objective Measures:        Wrist ext/flex Rd/ud  FA sup/pron  Elbow ext/flex       65/80    80/85  0/140 (\"stiff\")   WNL 70/80      5HE/140    II  Lateral Pinch  3 Point Pinch  MMT: wrist ext   30 with pain  11  11  4+/5 with pain   20  Right - 50  Left -  R 52#      5/5 wrist ext/flex    R 42       L 45  14     14  11     11  5/5 B scaption, shoulder flexion, elbow ext/flex, wrist flex/ext (minimal pain reported R wrist ext with wrist ext)        14   14  11   11   Modalities:        US, cont, 1MHz, 1.2wcm2, lateral elbow/FA - 8' same 8' 8'                   Therapeutic Exercise, Activities, NMR:        AROM 5x elbow AROM    Shoulder circles x 10     Shoulder/  scapular circles x 10 (cueing needed for technique)   Shoulder/  scapular circles x 10    Shoulder circles x 5 Shoulder circles x 5    Elbow AROM x 5       Composite Stretching 10x wrist ext, flex - shoulder down technique 10x2 (cueing for technique - passive stretching vs active performance) foreamrm stretching 10x2 composite stretching into power web 10x2   Conditioning Eccentric wrist ext x 10 (1#), concentric wrist flex x 10 (1#) 1#, 10x2 each as prior    Therabar, yellow, B sup/pron (neutral wrist, submax ) x 10 each, R pron x 10 2# eccentric wrist extension 10 x 2, concentric wrist flexion 10 x 2, 2 lbs. 2# eccentric wrist ext x 10, concentric wrist ext, flex x 10 each Therabar, yellow, red, green B sup/pron x 5 each; R sup/pron x 10 red - cueing for wrist position, submaximal gripping, proximal stabilization   Proximal scapular stabilization  10x \"supermans\" (modified position 90/90 due to hx of shoulder irritation)    10x each HAB thumbs up/down  Thoracic stabilization exercises in prone three positions x 15 each.  Instructed on B UE light resistive shoulder, scapular, elbow exercises (cueing for submaximal , neutral wrist)- 10x2 each B shoulder ext, triceps/elbow ext, biceps/elbow flex, rowing  Tband - B yellow, 12x each shoulder ext, elbow ext, rowing, elbow flex   ADL Retraining Instructed on diagnosis specific anatomy, joint protection, and ADL modifications - resource information provided Reviewed techniques; demonstrated alternative wrist/hand posturing for use of spray bottle & considerations for alternative sprayer/brand for increased ease  Reviewed lifting, holding techniques Reviewed   Corner Stretch X 5 x5  x5 x5                     Therapeutic Exercise and NMR:  [x] (66671) Provided verbal/tactile cueing for activities related to strengthening, flexibility, endurance, ROM  for improvements in scapular, scapulothoracic and UE control with self care, reaching, carrying, lifting, house/yardwork, driving/computer work. [x] (09674) Provided verbal/tactile cueing for activities related to improving balance, coordination, kinesthetic sense, posture, motor skill, proprioception  to assist with  scapular, scapulothoracic and UE control with self care, reaching, carrying, lifting, house/yardwork, driving/computer work.   [] Comments:    Therapeutic Activities:    [x] (82650 or 56786) Provided verbal/tactile cueing for activities related to improving balance, coordination, kinesthetic sense, posture, motor skill, proprioception and motor activation to allow for proper function of scapular, scapulothoracic and UE control with self care, carrying, lifting, driving/computer work  [] Comments:    Home Exercise Program:    [x] (90922) Reviewed/Progressed HEP activities related to strengthening, flexibility, endurance, ROM of scapular, scapulothoracic and UE control with self care, reaching, carrying, lifting, house/yardwork, driving/computer work  [x] (04832) Reviewed/Progressed HEP activities related to improving balance, coordination, kinesthetic sense, posture, motor skill, proprioception of scapular, scapulothoracic and UE control with self care, reaching, carrying, lifting, house/yardwork, driving/computer work    [] Comments:    Manual Treatments:  PROM / STM / Oscillations-Mobs:  G-I, II, III, IV (PA's, Inf., Post.)  [x] (19728) Provided manual therapy to mobilize soft tissue/joints of cervical/CT, scapular GHJ and UE for the purpose of modulating pain, promoting relaxation,  increasing ROM, reducing/eliminating soft tissue swelling/inflammation/restriction, improving soft tissue extensibility and allowing for proper ROM for normal function with self care, reaching, carrying, lifting, house/yardwork, driving/computer work  [x] Comments: 8' STM, DTM, CFM    ADL Training:  [x]  (47389) Provided self-care/home management training related to activities of daily living and compensatory training, and/or use of adaptive equipment   [x] Comments:reviewed diagnosis specific anatomy, joint protection, and ADL modifications     Splinting:  [] Fabrication of:   [] (93717) Orthotic/Prosthetic Management, subsequent encounter  [] (29502) Orthotic management and training (fitting and assessment)  [] Comments:    Charges:  Timed Code Treatment Minutes: 65   Total Treatment Minutes: 70     [] EVAL (LOW) 68706   [] OT Re-eval (68780)  [] EVAL (MOD) 76080   [] EVAL (HIGH) 08988       [x] Giselle (18396) x  1   [] PPAWC(64682)  [x] NMR (44876) x  1   [] Estim (attended) (54696)   [x] Manual ([id number]) x  1    [x] US (49873)   [] TA (17453) x      [] Paraffin (79949)  [] ADL  (54855) x     [] Splint/L code:    [] Estim (unattended) (53824)  [] Fluidotherapy (74284)  [] Other:    GOALS:  Short Term Goals: To be achieved in: 2 weeks  1. Independent in HEP and progression per patient tolerance, in order to prevent re-injury. 2. Patient will have a decrease in pain to facilitate improvement in movement, function, and ADLs as indicated by Functional Deficits.     Long Term Goals to be achieved in 4-6  weeks, including patient directed goals to address identified performance deficits:  1) Pt to be independent in graded HEP progression with a good level of effort and compliance.   2) Pt to report a score of 30 % or less on the Quick DASH disability questionnaire for increased performance with carrying, moving, and handling objects. Met 4/11/2019  3) Pt will demonstrate increased painfree full AROM (0/140) R elbow for improved independence with reaching into cabinets. Met 4/11/2019  4) Pt will demonstrate increased strength to R  >/= 75% of L for increased independence with opening jars. Met 4/11/2019  5) Pt will have a decrease in pain to 1-2/10 to enable return to knitting. 6)    7)       Progression Towards Functional goals:  [x] Patient is progressing as expected towards functional goals listed. [] Progression is slowed due to complexities listed. [] Progression has been slowed due to co-morbidities. [] Plan just implemented, too soon to assess goals progression  [] All goals are met  [x] Other: goals 2-4 met     ASSESSMENT:   Functional rom, strength progressing nicely, pain decreasing; reinforcement needed for exercise technique, activity modifications  Treatment/Activity Tolerance:  [x] Patient tolerated treatment well [] Patient limited by fatique  [] Patient limited by pain   [] Patient limited by other medical complications  [] Other:     Prognosis: [x] Good [] Fair  [] Poor    Patient Requires Follow-up: [x] Yes  [] No    PLAN: [x] Continue per plan of care [] Alter current plan (see comments)  [] Plan of care initiated [] Hold pending MD visit [] Discharge    If patient does not return for further follow ups after this date, please consider this as the patient's discharge from occupational therapy. Electronically signed by:  Andrew Gill OTR/L, PT, MPT, [address] [place] [address], [phone], [phone]

## 2019-04-10 NOTE — PLAN OF CARE
The [address] [facility] and [address] [facility]  [address], [place], [address] [facility]  Phone: [phone]   Fax:     [phone]                                                       Cynthia Holt    Dear  Referring Practitioner: Dr Elizabeth Jessica,    We had the pleasure of evaluating the following patient for physical therapy services at [address] [place] [address]. A summary of our findings can be found in the initial assessment below. This includes our plan of care. If you have any questions or concerns regarding these findings, please do not hesitate to contact me at the office phone number checked above. Thank you for the referral.       Physician Signature:_______________________________Date:__________________  By signing above (or electronic signature), therapists plan is approved by physician      Patient: Cdoi Wood   : 1945   MRN: [id number]  Referring Physician: Referring Practitioner: Dr Elizabeth Jessica      Evaluation Date: 4/10/2019      Medical Diagnosis Information:  Diagnosis: Low back pain, lumbar DDD   M54.5   Treatment Diagnosis: PT treatment diagnosis:  low back pain                                         Insurance information: PT Insurance Information: [facility]  visits based on medical necessity,  $0 copay     Precautions/ Contra-indications: Osteopenia  Latex Allergy:  [x]NO      []YES  Preferred Language for Healthcare:   [x]English       []other:    SUBJECTIVE: Patient stated complaint:  Began having right sided low back pain 2 months ago, gradually improved over time. Began having left sided low back pain 3-4 days ago and notices pain with twisting, bending over, sleeping at times. Denies radicular pain, N/T.  X-rays:  Lumbar DDD, unchanged since x-ray 2 years ago.    No follow up currently scheduled with MD.      Relevant Medical History:see above  Functional Disability Index:Mod TOOTIE  8%    Pain Scale: 0/10    Easing factors: Aleve prn    Provocative factors: twisting, bending forward, sleep, prolonged     Night Pain: none     Type: []Constant   []Intermittent  []Radiating []Localized []other:     Numbness/Tingling: none     Red Flag Symptoms: Denied symptoms associated with more severe pathology    to include loss of bowel and bladder control, fever, chills, nausea, headache, recent weight gain, recent weight loss, night sweats, decreased appetite, fatigue. Functional Limitations/Impairments: []Sitting []Standing []Walking    []Squatting/bending  []Stairs           []ADL's  []Transfers []Sports/Recreation [x]Other: yard work    Occupation/School: retired    Sport/recreational activities:      Living Status/Prior Level of Function: This patient was independent in ADL's and IADL's prior to onset of symptoms.        OBJECTIVE:       Standing Exam Normal Abnormal N/A Comments   Toe walk   x      Heel Walk x      Pelvic Height x      Fwd Bend- (aberrant juttering or innominate mvmt)- Standing Flexion Test x      Extension x      Sacral Sulcus Test (Side Flexion) x      Trendelenburg x      Combined Movements                                   Seated Exam Normal Abnormal N/A Comments   Pelvic Height x      Seated Rotation x      Seated flexion x      B hip IR x      SLUMP Test x             Supine Exam Normal Abnormal N/A Comments   Hip flexion x      Abduction       ER x      IR x      LITTLE/Reji x      FADIR  x  (+) on L   SLR       Crossed SLR       Supine to sit x      Supine to Sit Test                            Prone Exam Normal Abnormal N/A Comments   Prone knee bend x      Prone hip IR x      B Achilles reflex/Pheasant       PA/Spring  x  Limited mobility L3-5   Prone Instability test       Sacral Spring/thrust x      Femoral Nerve Tension Test x               ROM LEFT RIGHT Comments   Lumbar Flex WNL     Lumbar Ext WNL     Side Bend WNL WNL Tightness felt with RSB   Rotation WNL WNL                  ROM LEFT RIGHT Comments   Hip Flexion 110 110    Hip Abd      Hip ER 40 40    Hip IR 25 25    Hip Extension      Knee Ext      Knee Flex      Hamstring Flex -40 -30    Piriformis                    Strength LEFT RIGHT Comments   Multifidus 4 5    Transverse Ab      Hip Flexors 5 5    Hip Abductors 4 5    Hip Extensors 4 5                   Myotomes Normal Abnormal Comments   Hip flexion (L1-L2) x     Knee extension (L2-L4) x     Dorsiflexion (L4-L5) x     Great Toe Ext (L5) x     Ankle Eversion (S1-S2) x     Ankle PF(S1-S2) x         Dermatomes Normal Abnormal Comments   inguinal area (L1)  x     anterior mid-thigh (L2) x     distal ant thigh/med knee (L3) x     medial lower leg and foot (L4) x     lateral lower leg and foot (L5) x     posterior calf (S1) x     medial calcaneus (S2) x           Reflexes Normal Abnormal Comments   S1-2 Seated achilles x     S1-2 Prone knee bend      L3-4 Patellar tendon x     C5-6 Biceps      C6 Brachioradialis      C7-8 Triceps      Clonus      Babinski      Watson's        Joint mobility: Lower lumbar   []Normal    [x]Hypo   []Hyper    Palpation: L PSIS    Functional Mobility/Transfers: independent    Posture: level pelvic landmarks in standing, sitting    Gait: (include devices/WB status) WNL    Bandages/Dressings/Incisions: n/a                         [x] Patient history, allergies, meds reviewed. Medical chart reviewed. See intake form. Review Of Systems (ROS):  [x]Performed Review of systems (Integumentary, CardioPulmonary, Neurological) by intake and observation. Intake form has been scanned into medical record. Patient has been instructed to contact their primary care physician regarding ROS issues if not already being addressed at this time.       Co-morbidities/Complexities (which will affect course of rehabilitation):   []None           Arthritic conditions   []Rheumatoid arthritis (M05.9)  []Osteoarthritis (M19.91)   Cardiovascular conditions   []Hypertension (I10)  []Hyperlipidemia (E78.5)  []Angina pectoris (I20)  []Atherosclerosis (I70)   Musculoskeletal conditions   []Disc pathology   []Congenital spine pathologies   []Prior surgical intervention  []Osteoporosis (M81.8)  [x]Osteopenia (M85.8)   Endocrine conditions   []Hypothyroid (E03.9)  []Hyperthyroid Gastrointestinal conditions   []Constipation (Y43.04)   Metabolic conditions   []Morbid obesity (E66.01)  []Diabetes type 1(E10.65) or 2 (E11.65)   []Neuropathy (G60.9)     Pulmonary conditions   []Asthma (J45)  []Coughing   []COPD (J44.9)   Psychological Disorders  []Anxiety (F41.9)  []Depression (F32.9)   []Other:   []Other:          Barriers to/and or personal factors that will affect rehab potential:              []Age  []Sex              []Motivation/Lack of Motivation                        [x]Co-Morbidities              []Cognitive Function, education/learning barriers              []Environmental, home barriers              []profession/work barriers  []past PT/medical experience  []other:  Justification: Osteopenia will limit manuals    Falls Risk Assessment (30 days):   [x] Falls Risk assessed and no intervention required.   [] Falls Risk assessed and Patient requires intervention due to being higher risk   TUG score (>12s at risk):     [] Falls education provided, including       G-Codes:  PT G-Codes  Functional Assessment Tool Used: Modified TOOTIE  Score: 8%    ASSESSMENT:   Functional Impairments:     [x]Noted lumbar/proximal hip hypomobility   []Noted lumbosacral and/or generalized hypermobility   []Decreased Lumbosacral/hip/LE functional ROM   [x]Decreased core/proximal hip strength and neuromuscular control    []Decreased LE functional strength    []Abnormal reflexes/sensation/myotomal/dermatomal deficits  []Reduced balance/proprioceptive control    []other:      Functional Activity Limitations (from functional questionnaire and intake)   []Reduced ability to tolerate prolonged functional positions   []Reduced ability or difficulty with changes of positions or transfers between positions   []Reduced ability to maintain good posture and demonstrate good body mechanics with sitting, bending, and lifting   [x]Reduced ability to sleep   [] Reduced ability or tolerance with driving and/or computer work   []Reduced ability to perform lifting, reaching, carrying tasks   []Reduced ability to squat   [x]Reduced ability to forward bend   []Reduced ability to ambulate prolonged functional periods/distances/surfaces   []Reduced ability to ascend/descend stairs   []other:       Participation Restrictions   []Reduced participation in self care activities   []Reduced participation in home management activities   []Reduced participation in work activities   []Reduced participation in social activities. [x]Reduced participation in sport/recreation activities. Classification:   [x]Signs/symptoms consistent with Lumbar instability/stabilization subgroup. []Signs/symptoms consistent with Lumbar mobilization/manipulation subgroup, myotomes and dermatomes intact. Meets manipulation criteria. []Signs/symptoms consistent with Lumbar direction specific/centralization subgroup   []Signs/symptoms consistent with Lumbar traction subgroup     []Signs/symptoms consistent with lumbar facet dysfunction   []Signs/symptoms consistent with lumbar stenosis type dysfunction   []Signs/symptoms consistent with nerve root involvement including myotome & dermatome dysfunction   []Signs/symptoms consistent with post-surgical status including: decreased ROM, strength and function.    []signs/symptoms consistent with pathology which may benefit from Dry needling     []other:    Prognosis/Rehab Potential:      []Excellent   [x]Good    []Fair   []Poor    Tolerance of evaluation/treatment:    []Excellent   [x]Good    []Fair   []Poor    Physical Therapy Evaluation Complexity Justification  [x] A history of present problem with:  [] no personal factors and/or comorbidities that impact the plan of care;  [x]1-2 personal factors and/or comorbidities that impact the plan of care  []3 personal factors and/or comorbidities that impact the plan of care  [x] An examination of body systems using standardized tests and measures addressing any of the following: body structures and functions (impairments), activity limitations, and/or participation restrictions;:  [] a total of 1-2 or more elements   [x] a total of 3 or more elements   [] a total of 4 or more elements   [x] A clinical presentation with:  [x] stable and/or uncomplicated characteristics   [] evolving clinical presentation with changing characteristics  [] unstable and unpredictable characteristics;   [x] Clinical decision making of [x] low, [] moderate, [] high complexity using standardized patient assessment instrument and/or measurable assessment of functional outcome. [x] EVAL (LOW) 50800 (typically 20 minutes face-to-face)  [] EVAL (MOD) 02411 (typically 30 minutes face-to-face)  [] EVAL (HIGH) 89053 (typically 45 minutes face-to-face)  [] RE-EVAL       PLAN: Begin PT focusing on: proximal hip mobilizations, LB mobs, LB core activation, proximal hip activation, and HEP    Frequency/Duration:  1-2 days per week for 4 Weeks:  Interventions:  1. Therapeutic exercise including: strength training, ROM/flexibility, NMR and proprioception for the proximal upper extremity and deep neck flexors. 1 Therapeutic exercise including: strength training, ROM/flexibility, NMR and proprioception for the core, hips and bilateral lower extremities. 2. Manual therapy as indicated including Dry Needling/IASTM, STM, PROM, Gr I-IV mobilizations, spinal mobilization/manipulation. 3. Modalities as needed including: thermal agents, E-stim, US, iontophoresis as indicated. 4. Patient education on spine protection, activity modification, progression of HEP. HEP instruction: Can be found in media file. (see scanned forms)    GOALS:  Patient stated goal: decrease pain    Therapist goals for Patient:Lumbar   Short Term Goals: To be achieved in: 2 weeks  1. Independent in HEP and progression per patient tolerance, in order to prevent re-injury. 2. Patient will have a decrease in pain to facilitate improvement in movement, function, and ADLs as indicated by Functional Deficits. Long Term Goals: To be achieved in: 4 weeks  1. Disability index score of 0% for the TOOTIE to assist with reaching prior level of function. 2. Patient will demonstrate increased AROM to WNL, good LS mobility, good hip ROM to allow for proper joint functioning as indicated by patients Functional Deficits. 3. Patient will demonstrate an increase in Strength to good proximal hip and core activation to allow for proper functional mobility as indicated by patients Functional Deficits. 4. Patient will return to bending forward to put on shoes, get in/out of car, functional activities without increased symptoms or restriction.          Electronically signed by:  Shanthi Cuevas PT

## 2019-04-17 ENCOUNTER — HOSPITAL ENCOUNTER (OUTPATIENT)
Dept: PHYSICAL THERAPY | Age: 74
Setting detail: THERAPIES SERIES
Discharge: HOME | End: 2019-04-17
Payer: MEDICARE

## 2019-04-17 PROCEDURE — 97110 THERAPEUTIC EXERCISES: CPT | Performed by: PHYSICAL THERAPIST

## 2019-04-17 PROCEDURE — 97112 NEUROMUSCULAR REEDUCATION: CPT | Performed by: PHYSICAL THERAPIST

## 2019-04-23 ENCOUNTER — OFFICE VISIT (OUTPATIENT)
Dept: ORTHOPEDIC SURGERY | Age: 74
End: 2019-04-23
Payer: MEDICARE

## 2019-04-23 VITALS
HEIGHT: 63 IN | HEART RATE: 78 BPM | WEIGHT: 123.9 LBS | BODY MASS INDEX: 21.95 KG/M2 | SYSTOLIC BLOOD PRESSURE: 112 MMHG | DIASTOLIC BLOOD PRESSURE: 68 MMHG

## 2019-04-23 DIAGNOSIS — M67.40 MUCOID CYST OF JOINT: ICD-10-CM

## 2019-04-23 DIAGNOSIS — M79.672 FOOT PAIN, LEFT: Primary | ICD-10-CM

## 2019-04-23 DIAGNOSIS — M20.12 HALLUX VALGUS, LEFT: ICD-10-CM

## 2019-04-23 PROCEDURE — 99213 OFFICE O/P EST LOW 20 MIN: CPT | Performed by: PODIATRIST

## 2019-04-23 NOTE — PROGRESS NOTES
HISTORY OF PRESENT ILLNESS: This is a return visit for a 72-year-old female patient who has a new chief complaint of left foot bunion pain. The patient's had the symptoms for several years and it seems to be worsening. Many over-the-counter type remedies have been tried without any long-lasting success. The pain is aggravated with shoegear and activity. Proper fitting shoes are increasingly more difficult to find. It has been painful even without shoes at times. She also complains of an occasionally painful cyst that developed on her left great toe. She mainly does not like the cosmetic appearance of it. FAMILY HISTORY: Bunion deformities are present in the family, the remainder of the history is documented in the chart. SOCIAL HISTORY:  Documented in chart. REVIEW OF SYSTEMS:  The patient denies any fever, chills, or night sweats. The patient also denies developing any type of rash. The patient denies any problems with cardiovascular, pulmonary, gastrointestinal, neurologic, urologic, genitourinary, psychiatric, dermatologic, and HEENT systems. PHYSICAL EXAM:  A large prominence is noted at the medial aspect of the 1st MTP of the left foot. There is mild erythema overlying the emminence. The majority of the palpable tenderness is over this area. The deformity is not trackbound with range of motion to the joint. At the dorsal medial aspect of the left hallux IPJ she has a small compressible soft tissue lesion. This is less than a centimeter in diameter. It is slightly ecchymotic. It is nonpainful to palpation. She has palpable pedal pulses bilateral.  Her sensation is grossly intact bilateral.      X-RAYS:  Three weightbearing views of the left foot were obtained. These demonstrate a moderate increase in the 1st intermetatarsal angle. The hallux is abducted.       ASSESSMENT:  Hallux Valgus, mucoid cyst left foot    PLAN:  I educated the patient on the pathology and its various treatment options. Both conservative and surgical treatment options were discussed. I have answered all questions regardng the topic. I advised her to seek wider shoes to accommodate the deformity. She can certainly try different bunion pads for comfort. As far as the mucoid cyst, since she is not having any consistent pain I do not recommend any treatment for it. I'll see her back as needed.

## 2019-05-02 ENCOUNTER — HOSPITAL ENCOUNTER (OUTPATIENT)
Dept: OCCUPATIONAL THERAPY | Age: 74
Setting detail: THERAPIES SERIES
Discharge: HOME | End: 2019-05-02
Payer: MEDICARE

## 2019-05-02 PROCEDURE — 97110 THERAPEUTIC EXERCISES: CPT | Performed by: OCCUPATIONAL THERAPIST

## 2019-05-02 PROCEDURE — 97140 MANUAL THERAPY 1/> REGIONS: CPT | Performed by: OCCUPATIONAL THERAPIST

## 2019-05-02 PROCEDURE — 97035 APP MDLTY 1+ULTRASOUND EA 15: CPT | Performed by: OCCUPATIONAL THERAPIST

## 2019-05-02 NOTE — DISCHARGE SUMMARY
The [address] [facility] and 500 [facility]       Occupational Therapy Discharge  Date:  2019    Patient Name:  Shauna Rutledge    :  1945 MRN: [id number]    Restrictions/Precautions:     Medical/Treatment Diagnosis Information:  Diagnosis: M77.11 (ICD-10-CM) - Right lateral epicondylitis    Treatment Diagnosis: M25.521 (ICD-10-CM) - Right elbow pain   Insurance/Certification information: OT Insurance Information: [employer]  Physician Information:  Referring Practitioner: Dre Watkins MD  Plan of care signed (Y/N):  Y    Total Visits:  6    Starting Date:  3/5/19    Ending Date: 19      OBJECTIVE  Test used Initial score Discharge Score   Pain Summary Numeric Scale 6/10 2/10   Functional questionnaire Quick DASH 48% 16%   ROM Elbow ext/flex   0/140 5HE/140    Wrist ext/flex             65/80 70/80         Strength  II  Lateral Pinch  3 Point Pinch  MMT: wrist ext                 Flex         Elbow ext/flex   30# with pain  11  11  4+/5  5/5  5/5 55#  14  13  5/5  5/5  5/5            Functional Score:  Quick DASH 16%            Treatment to date:  [x] Therapeutic Exercise    Modalities:  [x] Therapeutic Activity                   [x] Ultrasound             []Electrical Stimulation  [x] ADL Training     [] Paraffin  []Iontophoresis  [x] Neuromuscular Re-education [x] Cold/hotpack          [x] Instruction in HEP     [] Fluidotherapy  [x] Manual Therapy     [] Other:                        ?   [] Splinting    Assessment:  [x] All Goals were achieved.   [] The following goals were achieved (#'s):  [] The following goals were not achieved for the following reasons:    Summary of improvement as it relates to each goal:    Comorbidities Affecting Functional Performance:     []Anxiety (F41.9)/Depression (F32.9)   []Diabetes Type 1(E10.65) or 2 (E11.65)   []Rheumatoid Arthritis (M05.9)  []Fibromyalgia (M79.7)  []Neuropathy(G60.9)  []Osteoarthritis(M19.91)  []None   [x]Other: osteoporosis    Plan:  Reason for Discharge:   [x] All goals achieved    [] Patient having surgery    [] Physician discontinued therapy    [] Insurance/Financial Limitations   [] Patient did not return for follow up visits   [] Home program/1 visit only   [] No subjective or objective improvement   [] Plateaued   [] Patient was unable to adhere to the plan of care established at evaluation. [] Referred back to physician for re-evaluation and did not return. [] Other:?       Electronically signed by:   Andrew Gill OTR/L, PT, MPT, [place], [id number], [id number]

## 2019-05-02 NOTE — FLOWSHEET NOTE
52#      5/5 wrist ext/flex    R 42       L 45  14     14  11     11  5/5 B scaption, shoulder flexion, elbow ext/flex, wrist flex/ext (minimal pain reported R wrist ext with wrist ext) R 55#    L 48#  14      15  13      13  5/5 wrist ext/flex        14   14  11   11    Modalities:         US, cont, 1MHz, 1.2wcm2, lateral elbow/FA - 8' same 8' 8' 8'                     Therapeutic Exercise, Activities, NMR:         AROM 5x elbow AROM    Shoulder circles x 10     Shoulder/  scapular circles x 10 (cueing needed for technique)   Shoulder/  scapular circles x 10    Shoulder circles x 5 Shoulder circles x 5    Elbow AROM x 5     10x elbow AROM    R UE elbow ext/flex slides while seated at plinth - cueing for relaxed performance; activity driven proximally vs distally     Composite Stretching 10x wrist ext, flex - shoulder down technique 10x2 (cueing for technique - passive stretching vs active performance) foreamrm stretching 10x2 composite stretching into power web 10x2 10x manual stretch by therapist    10x each into PW    Biceps stretching x 5   Conditioning Eccentric wrist ext x 10 (1#), concentric wrist flex x 10 (1#) 1#, 10x2 each as prior    Therabar, yellow, B sup/pron (neutral wrist, submax ) x 10 each, R pron x 10 2# eccentric wrist extension 10 x 2, concentric wrist flexion 10 x 2, 2 lbs. 2# eccentric wrist ext x 10, concentric wrist ext, flex x 10 each Therabar, yellow, red, green B sup/pron x 5 each; R sup/pron x 10 red - cueing for wrist position, submaximal gripping, proximal stabilization    Proximal scapular stabilization  10x \"supermans\" (modified position 90/90 due to hx of shoulder irritation)    10x each HAB thumbs up/down  Thoracic stabilization exercises in prone three positions x 15 each.  Instructed on B UE light resistive shoulder, scapular, elbow exercises (cueing for submaximal , neutral wrist)- 10x2 each B shoulder ext, triceps/elbow ext, biceps/elbow flex, rowing  Tband - B yellow, 12x each shoulder ext, elbow ext, rowing, elbow flex    ADL Retraining Instructed on diagnosis specific anatomy, joint protection, and ADL modifications - resource information provided Reviewed techniques; demonstrated alternative wrist/hand posturing for use of spray bottle & considerations for alternative sprayer/brand for increased ease  Reviewed lifting, holding techniques Reviewed    Corner Stretch X 5 x5  x5 x5 x5   UE Warfield      20x elbow ext/flex       Pulleys      20x     Therapeutic Exercise and NMR:  [x] (81782) Provided verbal/tactile cueing for activities related to strengthening, flexibility, endurance, ROM  for improvements in scapular, scapulothoracic and UE control with self care, reaching, carrying, lifting, house/yardwork, driving/computer work. [x] (98522) Provided verbal/tactile cueing for activities related to improving balance, coordination, kinesthetic sense, posture, motor skill, proprioception  to assist with  scapular, scapulothoracic and UE control with self care, reaching, carrying, lifting, house/yardwork, driving/computer work.   [] Comments:    Therapeutic Activities:    [] (63047 or 05713) Provided verbal/tactile cueing for activities related to improving balance, coordination, kinesthetic sense, posture, motor skill, proprioception and motor activation to allow for proper function of scapular, scapulothoracic and UE control with self care, carrying, lifting, driving/computer work  [] Comments:    Home Exercise Program:    [x] (59538) Reviewed/Progressed HEP activities related to strengthening, flexibility, endurance, ROM of scapular, scapulothoracic and UE control with self care, reaching, carrying, lifting, house/yardwork, driving/computer work  [x] (65736) Reviewed/Progressed HEP activities related to improving balance, coordination, kinesthetic sense, posture, motor skill, proprioception of scapular, scapulothoracic and UE control with self care, reaching, carrying, lifting, independent in graded HEP progression with a good level of effort and compliance. Met 5/2/2019  2) Pt to report a score of 30 % or less on the Quick DASH disability questionnaire for increased performance with carrying, moving, and handling objects. Met 4/11/2019  3) Pt will demonstrate increased painfree full AROM (0/140) R elbow for improved independence with reaching into cabinets. Met 4/11/2019  4) Pt will demonstrate increased strength to R  >/= 75% of L for increased independence with opening jars. Met 4/11/2019  5) Pt will have a decrease in pain to 1-2/10 to enable return to knitting. Met 5/2/2019  6)    7)       Progression Towards Functional goals:  [] Patient is progressing as expected towards functional goals listed. [] Progression is slowed due to complexities listed. [] Progression has been slowed due to co-morbidities. [] Plan just implemented, too soon to assess goals progression  [x] All goals appear met  [] Other:      ASSESSMENT:   Increased ROM, strength, & function since beginning treatment; Decreased overall pain reported at rest and during activities  Treatment/Activity Tolerance:  [x] Patient tolerated treatment well [] Patient limited by fatique  [] Patient limited by pain   [] Patient limited by other medical complications  [] Other:     Prognosis: [x] Good [] Fair  [] Poor    Patient Requires Follow-up: [] Yes  [x] No    PLAN: [] Continue per plan of care [] Alter current plan (see comments)  [] Plan of care initiated [] Hold pending MD visit [x] Discharge to [facility] at this time    If patient does not return for further follow ups after this date, please consider this as the patient's discharge from occupational therapy. Electronically signed by:  Andrew Gill OTR/L, PT, MPT, [place], [id number], [id number]

## 2019-09-06 ENCOUNTER — OFFICE VISIT (OUTPATIENT)
Dept: ORTHOPEDIC SURGERY | Age: 74
End: 2019-09-06
Payer: MEDICARE

## 2019-09-06 VITALS — WEIGHT: 123.9 LBS | BODY MASS INDEX: 21.95 KG/M2 | HEIGHT: 63 IN

## 2019-09-06 DIAGNOSIS — M25.562 LEFT KNEE PAIN, UNSPECIFIED CHRONICITY: Primary | ICD-10-CM

## 2019-09-06 DIAGNOSIS — M22.42 PATELLOFEMORAL CHONDROSIS OF LEFT KNEE: ICD-10-CM

## 2019-09-06 PROCEDURE — 99214 OFFICE O/P EST MOD 30 MIN: CPT | Performed by: ORTHOPAEDIC SURGERY

## 2020-12-17 ENCOUNTER — OFFICE VISIT (OUTPATIENT)
Dept: ORTHOPEDIC SURGERY | Age: 75
End: 2020-12-17
Payer: MEDICARE

## 2020-12-17 VITALS — HEIGHT: 63 IN | WEIGHT: 123.9 LBS | RESPIRATION RATE: 14 BRPM | BODY MASS INDEX: 21.95 KG/M2 | TEMPERATURE: 96.6 F

## 2020-12-17 PROCEDURE — 99203 OFFICE O/P NEW LOW 30 MIN: CPT | Performed by: PHYSICAL MEDICINE & REHABILITATION

## 2020-12-17 RX ORDER — MELOXICAM 15 MG/1
15 TABLET ORAL DAILY
Qty: 30 TABLET | Refills: 0 | Status: SHIPPED | OUTPATIENT
Start: 2020-12-17

## 2020-12-17 NOTE — PROGRESS NOTES
New Patient: SPINE    Referring Provider:  No ref. provider found    Chief Complaint   Patient presents with    Lower Back Pain     NP LSP - 1 yr no CARINE. intermittent pain. some pain into SI joint. bothersome w/ rolling over and twisting. no tx hx from physician. HISTORY OF PRESENT ILLNESS:      · The patient is being sent at the request of No ref. provider found in consultation as a new spine patient for low back pain The patient is a 70 y.o. female whom reports low back pain for 1 year. The pain has been progressing over the past couple of months. The patient rates her pain a 3/10 and describes it as dull, aching, sharp, stabbing and stiffness. She is having some pain radiating into the SI joint however this is not sound like radicular pain. The patient states her pain is bothersome with rolling over and twisting. She has not had any treatment from her primary care physician or any other physicians. The patient does state that the pain improves throughout the day.     Pain Assessment  Location of Pain: Back(LSP)  Location Modifiers: Right(HIP/BUTTOCKS)  Severity of Pain: 3  Quality of Pain: Sharp, Dull, Aching(STABBING / STIFFNESS)  Duration of Pain: Persistent  Frequency of Pain: Intermittent  Date Pain First Started: (1 YR)  Aggravating Factors: (TWISTING / ROLLING OVER IN BED)  Limiting Behavior: Yes  Relieving Factors: (IMPROVES THROUGHOUT THE DAY)  Result of Injury: No  Work-Related Injury: No  Are there other pain locations you wish to document?: No      Associated signs and symptoms:   Neurogenic bowel or bladder symptoms:  no   Perceived weakness:  no   Difficulty walking:  no    Recent Imaging (within past one year)   Xrays: no   MRI or CT of spine: no    Current/Past Treatment:   · Physical Therapy:  yes  · Chiropractic:  none  · Injection:  none  · Medications:   NSAIDS:  yes   Muscle relaxer:  none   Steriods:  none   Neuropathic medications:  none   Opioids:  none  · Previous surgery: swelling, morning stiffness, unusual joint pain  Neurological: No headache, confusion, syncope  Psychiatric: No excessive anxiety or depression  Endocrine: No polyuria or polydipsia  Hematologic: No lymph node enlargement or excessive bleeding  Immunologic:No history of immune deficiency or immunomodulating drugs           PHYSICAL EXAM:    Vitals: Temperature 96.6 °F (35.9 °C), resp. rate 14, height 5' 2.52\" (1.588 m), weight 123 lb 14.4 oz (56.2 kg). GENERAL EXAM:  · General Apparence: Patient is adequately groomed with no evidence of malnutrition. · Psychiatric: Orientation: The patient is oriented to time, place and person. The patient's mood and affect are appropriate   · Vascular: Examination reveals no swelling and palpation reveals no tenderness in upper or lower extremities. Good capillary refill. · The lymphatic examination of the neck, axillae and groin reveals all areas to be without enlargement or induration   Sensation is intact without deficit in the upper and lower extremities to light touch and pinprick  · Coordination of the upper and lower extremities are normal.    CERVICAL EXAMINATION:  · Inspection: Local inspection shows no step-off or bruising. Cervical alignment is normal. No instability is noted. · Palpation and Percussion: No evidence of tenderness at the midline. Paraspinal tenderness is not present. There is no paraspinal spasm. · Range of Motion:  limited by 25% in all planes due to pain   · Strength: 5/5 bilateral upper extremities  · Special Tests:   Spurling's and Watson's are negative bilaterally. Moyer and Impingement tests are negative bilaterally. · Skin:There are no rashes, ulcerations or lesions. · Reflexes: Bilaterally triceps, biceps and brachioradialis are 2+. Clonus absent bilaterally at the feet. No pathological reflexes are noted.   · Gait & station:  normal, patient ambulates without assistance and no ataxia  · Additional Examinations:  · RIGHT UPPER EXTREMITY:  Inspection/examination of the right upper extremity does not show any tenderness, deformity or injury. Range of motion is normal and pain-free. There is no gross instability. There are no rashes, ulcerations or lesions. Strength and tone are normal. No atrophy or abnormal movements are noted. · LEFT UPPER EXTREMITY: Inspection/examination of the left upper extremity does not show any tenderness, deformity or injury. Range of motion is normal and pain-free. There is no gross instability. There are no rashes, ulcerations or lesions. Strength and tone are normal. No atrophy or abnormal movements are noted. LUMBAR/SACRAL EXAMINATION:  · Inspection: Local inspection shows no step-off or bruising. Lumbar alignment is normal. No instability is noted. · Palpation:   No evidence of tenderness at the midline. Lumbar paraspinal tenderness Mild L4/5 and L5/S1 tenderness  Bursal tenderness No tenderness bilaterally  There is no paraspinal spasm. · Range of Motion: painful with facet loading with extension and rotation to the right  · Strength:   Strength testing is 5/5 in all muscle groups tested. · Special Tests:   Straight leg raise and crossed SLR negative. Reji's testing is negative bilaterally. FADIR's testing is negative bilaterally. Slump test negative. Bowstring test negative  · Skin: There are no rashes, ulcerations or lesions. · Reflexes: Reflexes are symmetrically 2+ at the patellar and ankle tendons. Clonus absent bilaterally at the feet. · Gait & station: normal, patient ambulates without assistance and no ataxia  · Additional Examinations:  · RIGHT LOWER EXTREMITY: Inspection/examination of the right lower extremity does not show any tenderness, deformity or injury. Range of motion is unremarkable. There is no gross instability. There are no rashes, ulcerations or lesions. Strength and tone are normal. No atrophy or abnormal movements are noted.   · LEFT LOWER EXTREMITY: Inspection/examination of the left lower extremity does not show any tenderness, deformity or injury. Range of motion is unremarkable. There is no gross instability. There are no rashes, ulcerations or lesions. Strength and tone are normal. No atrophy or abnormal movements are noted. Diagnostic Testing:      No results found. Xrays:   AP and lateral of the lumbar spine taken today in the office show L4/5 and L5/S1 mild-moderate DDD, subtle L4/5 spondylolisthesis  MRI or CT:  None  EMG:  None  Results for orders placed or performed in visit on 18   Surgical Pathology   Result Value Ref Range    Pathology Report See report below          Impression (Medical Decision Making):       1. Spondylosis without myelopathy or radiculopathy, lumbar region    2. Pain of lumbar spine    3. Degenerative disc disease, lumbar    4. Spondylolisthesis of lumbar region        Plan (Medical Decision Making):    I discussed the diagnosis and the treatment options with Casey Ochoa today. In Summary:  The various treatment options were outlined and discussed with Casey Ochoa including:  Conservative care options: physical therapy, ice, medications, bracing, and activity modification. The indications for therapeutic injections. The indications for additional imaging/laboratory studies. The indications for (possible future) interventions. After considering the various options discussed, Casey Ochoa elected to pursue a course of treatment that includes the followin. Medications: I will prescribe Meloxicam 15 mg  to help with inflammation and reduce pain. CV, GI and Nephro risks were reviewed. 2. PT:  I will start the patient on a trial of PT to work on a lumbar stabilization program to focus on core strengthening, core stabilizing, lumbar stretches, hamstring flexibility, modalities as indicated for 6-8 visits over the next 4-6 weeks. 3. Further studies: MRI after a trial of PT      4. Interventional:  At this point, no interventional options are recommended. 5. Healthy Lifestyle Measures:  Patient education material reviewing the following was distributed to Katie  Anatomic drawings  Healthy lifestyle education  Osteoporosis prevention,   Back and neck pain educational information   Advanced imaging preparedness    Posture education   Proper lifting and carrying techniques,   Weight management  Quitting smoking and   Minor ways to treat back pain  For further information regarding the spine conditions and to review interventional treatments the patient was directed to [facility]).    6.  Follow up:  4-6 weeks    Katie was instructed to call the office if her symptoms worsen or if new symptoms appear prior to the next scheduled visit. She is specifically instructed to contact the office between now & her scheduled appointment if she has concerns related to her condition or if she needs assistance in scheduling the above tests. She is welcome to call for an appointment sooner if she has any additional concerns or questions. Piedad Lombard. Mt Valdez MD, JHONATHAN, [facility]  Board Certified in [address] [place] [address] Certified and Fellowship Trained in [facility] ([facility])     This dictation was performed with a verbal recognition program [facility]) and it was checked for errors. It is possible that there are still dictated errors within this office note. If so, please bring any errors to my attention for an addendum. All efforts were made to ensure that this office note is accurate.

## 2020-12-30 ENCOUNTER — HOSPITAL ENCOUNTER (OUTPATIENT)
Dept: PHYSICAL THERAPY | Age: 75
Setting detail: THERAPIES SERIES
Discharge: HOME | End: 2020-12-30
Payer: MEDICARE

## 2020-12-30 PROCEDURE — 97110 THERAPEUTIC EXERCISES: CPT

## 2020-12-30 PROCEDURE — 97161 PT EVAL LOW COMPLEX 20 MIN: CPT

## 2020-12-30 NOTE — FLOWSHEET NOTE
NMR re-education (51010)  CUES NEEDED                                                Therapeutic Activity (60614)                                       Therapeutic Exercise and NMR EXR  [x] (67996) Provided verbal/tactile cueing for activities related to strengthening, flexibility, endurance, ROM  for improvements in proximal hip and core control with self care, mobility, lifting and ambulation.  [] (24892) Provided verbal/tactile cueing for activities related to improving balance, coordination, kinesthetic sense, posture, motor skill, proprioception  to assist with core control in self care, mobility, lifting, and ambulation.      Therapeutic Activities:    [] (42649 or 17195) Provided verbal/tactile cueing for activities related to improving balance, coordination, kinesthetic sense, posture, motor skill, proprioception and motor activation to allow for proper function  with self care and ADLs  [] (66124) Provided training and instruction to the patient for proper core and proximal hip recruitment and positioning with ambulation re-education     Home Exercise Program:    [x] (12419) Reviewed/Progressed HEP activities related to strengthening, flexibility, endurance, ROM of core, proximal hip and LE for functional self-care, mobility, lifting and ambulation   [] (06135) Reviewed/Progressed HEP activities related to improving balance, coordination, kinesthetic sense, posture, motor skill, proprioception of core, proximal hip and LE for self care, mobility, lifting, and ambulation      Manual Treatments:  PROM / STM / Oscillations-Mobs:  G-I, II, III, IV (PA's, Inf., Post.) [] (00819) Provided manual therapy to mobilize proximal hip and LS spine soft tissue/joints for the purpose of modulating pain, promoting relaxation,  increasing ROM, reducing/eliminating soft tissue swelling/inflammation/restriction, improving soft tissue extensibility and allowing for proper ROM for normal function with self care, mobility, lifting and ambulation. Modalities:       Charges:  Timed Code Treatment Minutes: 15 min   Total Treatment Minutes: 45 min       [x] EVAL (LOW) 17029   [] EVAL (MOD) 51628   [] EVAL (HIGH) 32353   [] RE-EVAL     [x] VI(30678) x 1     [] IONTO  [] NMR (09795) x     [] VASO  [] Manual (29001) x      [] Other:  [] TA x      [] Mech Traction (43925)  [] ES(attended) (97283)      [] ES (un) (81772):     Goals:  Patient stated goal:improve comfort especially when sleeping  [] Progressing: [] Met: [] Not Met: [] Adjusted    Therapist goals for Patient:   Short Term Goals: To be achieved in: 2 weeks  1. Independent in HEP and progression per patient tolerance, in order to prevent re-injury. [] Progressing: [] Met: [] Not Met: [] Adjusted  2. Patient will have a decrease in pain to facilitate improvement in movement, function, and ADLs as indicated by Functional Deficits. [] Progressing: [] Met: [] Not Met: [] Adjusted      Long Term Goals: To be achieved in: 6 weeks  1. Disability index score of 5% or less for the Mod Oswestry  to assist with reaching prior level of function. [] Progressing: [] Met: [] Not Met: [] Adjusted  2. Patient will demonstrate increased AROM to WNL, good LS mobility, good hip ROM to allow for proper joint functioning as indicated by patients Functional Deficits. [] Progressing: [] Met: [] Not Met: [] Adjusted  3. Patient will demonstrate an increase in Strength to good proximal hip and core activation to allow for proper functional mobility as indicated by patients Functional Deficits.    [] Progressing: [] Met: [] Not Met: [] Adjusted 4. Patient will be able turn and twist with ADL movement without increased symptoms or restriction. [] Progressing: [] Met: [] Not Met: [] Adjusted  5. Pt will be able to sleep undisturbed by pain and without restrictive pain or tightness upon waking. [] Progressing: [] Met: [] Not Met: [] Adjusted       Progression Towards Functional goals:  [] Patient is progressing as expected towards functional goals listed. [] Progression is slowed due to complexities listed. [] Progression has been slowed due to co-morbidities. [x] Plan just implemented, too soon to assess goals progression  [] Other:     Overall Progression Towards Functional goals/ Treatment Progress Update:  [] Patient is progressing as expected towards functional goals listed. [] Progression is slowed due to complexities/Impairments listed. [] Progression has been slowed due to co-morbidities. [x] Plan just implemented, too soon to assess goals progression <30days   [] Goals require adjustment due to lack of progress  [] Patient is not progressing as expected and requires additional follow up with physician  [] Other    Prognosis for POC: [x] Good [] Fair  [] Poor      Patient requires continued skilled intervention: [x] Yes  [] No    Treatment/Activity Tolerance:  [x] Patient able to complete treatment  [] Patient limited by fatigue  [] Patient limited by pain    [] Patient limited by other medical complications  [] Other:     ASSESSMENT: Pt says that these stretches feel pretty good and after my cueing was able to figure out how to stretch and move more through her lower lumbar segments rather than mostly just through upper lumbar and thorax. PLAN: See eval. Pt to be seen 2 times a week for 6 weeks.  Next visit: teach trans an  [] Continue per plan of care [] Alter current plan (see comments above)  [x] Plan of care initiated [] Hold pending MD visit [] Discharge      Electronically signed by:  Josafat Stafford, PT Note: If patient does not return for scheduled/ recommended follow up visits, this note will serve as a discharge from care along with most recent update on progress.

## 2020-12-30 NOTE — PLAN OF CARE
[facility] Jossy Mcgee 10; Brii Starks Certification    Dear Referring Practitioner: Dr. Griffiths ,    We had the pleasure of evaluating the following patient for physical therapy services at [address] [place] [address]. A summary of our findings can be found in the initial assessment below. This includes our plan of care. If you have any questions or concerns regarding these findings, please do not hesitate to contact me at the office phone number checked above.   Thank you for the referral.       Physician Signature:_______________________________Date:__________________  By signing above (or electronic signature), therapists plan is approved by physician    Patient: Carly Parra   : 1945   MRN: [id number]  Referring Physician: Referring Practitioner: Dr. Bishop Winter      Evaluation Date: 2020      Medical Diagnosis Information:  Diagnosis: M47.816 spondylosis w/o radiculopathy, M51.36 lumbar DDD, M43.16 lumbar spondylolithesis   Treatment Diagnosis: M54.5 LBP, M99.03 segmental & somatic dysfunction, M62.81 abdominal weakness                                         Insurance information: PT Insurance Information: [id number], no auth, med nec, no telehealth       Precautions/ Contra-indications: osteopenia    C-SSRS Triggered by Intake questionnaire (Past 2 wk assessment):   [x] No, Questionnaire did not trigger screening.   [] Yes, Patient intake triggered further evaluation      [] C-SSRS Screening completed  [] PCP notified via Plan of Care  [] Emergency services notified     Latex Allergy:  [x]NO      []YES  Preferred Language for Healthcare:   [x]English       []other: SUBJECTIVE: Patient stated complaint:Pt has been having a pain ain back and side for past year after doing a lot of moving and lifting and packaging and thought she pulled something, at recent visit was dx with DDD. She says really doesn't want it to get worse. Pt has to most pain and difficulty with static position particularly first thing in the morning (she is a side sleeper and has asleep number ), prolonged sitting (more than 1.5 hours), occasionally her pain can be sharp and seems to come out of nowhere, also provokes with twisting and heavy lifting. Denies radicular pain, stays mostly in R lateral low back.     Relevant Medical History: osteopenia, OA  Functional Disability Index/G-Codes:   Mod Oswestry: 10% disability    Height 5'2\" Weight 122lbs  Pain Scale: 3/10  Easing factors: stretching, handing (distraction)   Provocative factors: twisting     Type: []Constant   [x]Intermittent  []Radiating []Localized []other:     Numbness/Tingling: denies    Occupation/School: retired speech pathology    Living Status/Prior Level of Function: Independent with ADLs and IADLs, water aerobics, exercise classes, walking     OBJECTIVE:     ROM     LUMBAR FLEX 90%    LUMBAR % pain    Sidebend L 75% pain R side R 100%   Rotation 50% 50%    LEFT RIGHT   HIP Flex     HIP Abd     HIP ER     HIP IR     Knee Flex     Knee ext     Hamstring FLEX 100 deg 100 deg   Piriformis  WNL WNL             Strength  LEFT RIGHT   MfA good good   TrA poor    HIP Flexors 5 4+   HIP Abductors 5 5   HIP ER 5 5   Hip IR     Knee EXT (quad) 5 5   Knee Flex (HS) 5 5   Ankle DF 5 5   Ankle PF 5 5   Great Toe Ext 5 5     Reflexes/Sensation:    [x]Dermatomes/Myotomes intact    []UE Reflexes     []Normal []Hypo      []Hyper   []LE Reflexes     []Normal []Hypo      []Hyper   []Babinski/Clonus/Hoffmans:    []Other:    Joint mobility:    [x]Normal    []Hypo   []Hyper []profession/work barriers  []past PT/medical experience  []other:  Justification:     Falls Risk Assessment (30 days):   [x] Falls Risk assessed and no intervention required. [] Falls Risk assessed and Patient requires intervention due to being higher risk   TUG score (>12s at risk):     [] Falls education provided, including       ASSESSMENT: Pt demonstrate fairly good overall ROM though she moves very little at her lower lumbar segments though P-A glides WNL, she also demonstrates poor stabilization with TA and mild hip flex weakness. She will benefit from PT to address these impairments and improve ADLs.      Functional Impairments:     []Noted lumbar/proximal hip hypomobility   []Noted lumbosacral and/or generalized hypermobility   [x]Decreased Lumbosacral/hip/LE functional ROM   [x]Decreased core/proximal hip strength and neuromuscular control    []Decreased LE functional strength    []Abnormal reflexes/sensation/myotomal/dermatomal deficits  []Reduced balance/proprioceptive control    []other:      Functional Activity Limitations (from functional questionnaire and intake)   [x]Reduced ability to tolerate prolonged functional positions   []Reduced ability or difficulty with changes of positions or transfers between positions   []Reduced ability to maintain good posture and demonstrate good body mechanics with sitting, bending, and lifting   [x]Reduced ability to sleep   [] Reduced ability or tolerance with driving and/or computer work   [x]Reduced ability to perform lifting, reaching, carrying tasks   []Reduced ability to squat   []Reduced ability to forward bend   [x]Reduced ability to ambulate prolonged functional periods/distances/surfaces   []Reduced ability to ascend/descend stairs   []other:       Participation Restrictions   []Reduced participation in self care activities   [x]Reduced participation in home management activities   []Reduced participation in work activities [x]Reduced participation in social activities. []Reduced participation in sport/recreation activities. Classification:   [x]Signs/symptoms consistent with Lumbar instability/stabilization subgroup. []Signs/symptoms consistent with Lumbar mobilization/manipulation subgroup, myotomes and dermatomes intact. Meets manipulation criteria. []Signs/symptoms consistent with Lumbar direction specific/centralization subgroup   []Signs/symptoms consistent with Lumbar traction subgroup     []Signs/symptoms consistent with lumbar facet dysfunction   []Signs/symptoms consistent with lumbar stenosis type dysfunction   []Signs/symptoms consistent with nerve root involvement including myotome & dermatome dysfunction   []Signs/symptoms consistent with post-surgical status including: decreased ROM, strength and function.    []signs/symptoms consistent with pathology which may benefit from Dry needling     []other:      Prognosis/Rehab Potential:      []Excellent   [x]Good    []Fair   []Poor    Tolerance of evaluation/treatment:    []Excellent   [x]Good    []Fair   []Poor  Physical Therapy Evaluation Complexity Justification  [x] A history of present problem with:  [] no personal factors and/or comorbidities that impact the plan of care;  [x]1-2 personal factors and/or comorbidities that impact the plan of care  []3 personal factors and/or comorbidities that impact the plan of care  [x] An examination of body systems using standardized tests and measures addressing any of the following: body structures and functions (impairments), activity limitations, and/or participation restrictions;:  [x] a total of 1-2 or more elements   [] a total of 3 or more elements   [] a total of 4 or more elements   [x] A clinical presentation with:  [x] stable and/or uncomplicated characteristics   [] evolving clinical presentation with changing characteristics  [] unstable and unpredictable characteristics; [x] Clinical decision making of [x] low, [] moderate, [] high complexity using standardized patient assessment instrument and/or measurable assessment of functional outcome. [x] EVAL (LOW) 05637 (typically 20 minutes face-to-face)  [] EVAL (MOD) 96063 (typically 30 minutes face-to-face)  [] EVAL (HIGH) 43849 (typically 45 minutes face-to-face)  [] RE-EVAL         PLAN: Begin PT focusing on: proximal hip mobilizations, LB mobs, LB core activation, proximal hip activation, and HEP    Frequency/Duration:  2 days per week for 6 Weeks:  Interventions:  [x]  Therapeutic exercise including: strength training, ROM, for LE, Glutes and core   [x]  NMR activation and proprioception for glutes , LE and Core   [x]  Manual therapy as indicated for Hip complex, LE and spine to include: Dry Needling/IASTM, STM, PROM, Gr I-IV mobilizations, manipulation. [x]  Modalities as needed that may include: thermal agents, E-stim, Biofeedback, US, iontophoresis as indicated  [x]  Patient education on joint protection, postural re-education, activity modification, progression of HEP. HEP instruction: (see scanned forms)    GOALS:  Patient stated goal:improve comfort especially when sleeping  [] Progressing: [] Met: [] Not Met: [] Adjusted    Therapist goals for Patient:   Short Term Goals: To be achieved in: 2 weeks  1. Independent in HEP and progression per patient tolerance, in order to prevent re-injury. [] Progressing: [] Met: [] Not Met: [] Adjusted  2. Patient will have a decrease in pain to facilitate improvement in movement, function, and ADLs as indicated by Functional Deficits. [] Progressing: [] Met: [] Not Met: [] Adjusted      Long Term Goals: To be achieved in: 6 weeks  1. Disability index score of 5% or less for the Mod Oswestry  to assist with reaching prior level of function.    [] Progressing: [] Met: [] Not Met: [] Adjusted 2. Patient will demonstrate increased AROM to WNL, good LS mobility, good hip ROM to allow for proper joint functioning as indicated by patients Functional Deficits. [] Progressing: [] Met: [] Not Met: [] Adjusted  3. Patient will demonstrate an increase in Strength to good proximal hip and core activation to allow for proper functional mobility as indicated by patients Functional Deficits. [] Progressing: [] Met: [] Not Met: [] Adjusted  4. Patient will be able turn and twist with ADL movement without increased symptoms or restriction. [] Progressing: [] Met: [] Not Met: [] Adjusted  5. Pt will be able to sleep undisturbed by pain and without restrictive pain or tightness upon waking.    [] Progressing: [] Met: [] Not Met: [] Adjusted       Electronically signed by:  Zofia Gaxiola PT

## 2021-01-04 ENCOUNTER — HOSPITAL ENCOUNTER (OUTPATIENT)
Dept: PHYSICAL THERAPY | Age: 76
Setting detail: THERAPIES SERIES
Discharge: HOME | End: 2021-01-04
Payer: MEDICARE

## 2021-01-04 PROCEDURE — 97110 THERAPEUTIC EXERCISES: CPT

## 2021-01-04 PROCEDURE — 97112 NEUROMUSCULAR REEDUCATION: CPT

## 2021-01-04 NOTE — FLOWSHEET NOTE
[facility]opa[facility]ics [facility]; [place]         Physical Therapy Treatment Note/ Progress Report:           Date:  2021    Patient Name:  Pinky Soriano    :  1945  MRN: [id number]  Restrictions/Precautions:    Medical/Treatment Diagnosis Information:  · Diagnosis: M47.816 spondylosis w/o radiculopathy, M51.36 lumbar DDD, M43.16 lumbar spondylolithesis  · Treatment Diagnosis: M54.5 LBP, M99.03 segmental & somatic dysfunction, M62.81 abdominal weakness  Insurance/Certification information:  PT Insurance Information: Aetna, no auth, med [employer], no telehealth  Physician Information:  Referring Practitioner: Dr. Maurisio Zepeda  Has the plan of care been signed (Y/N):        []  Yes  [x]  No     Date of Patient follow up with Physician: as needed      Is this a Progress Report:     []  Yes  [x]  No        If Yes:  Date Range for reporting period:  Beginning 20  Ending    Progress report will be due (10 Rx or 30 days whichever is less):        Recertification will be due (POC Duration  / 90 days whichever is less): 2/10/21        Visit # Insurance Allowable Auth Required   In-person ,  Med nec []  Yes [x]  No    Telehealth no Not covered []  Yes []  No    Total            Functional Scale: Mod Oswestry: 10% disability   Date assessed:  20        Number of Comorbidities:  []0     [x]1-2    []3+    Latex Allergy:  [x]NO      []YES  Preferred Language for Healthcare:   [x]English       []other:      Pain level:  0/10    SUBJECTIVE: Pt says the pillow supporting under her side has really helped and so has woken up with less pain. She says HEP going well as well. OBJECTIVE:   ? Observation: P-A mobility WNL lumbar spine  ?  Test measurements:      RESTRICTIONS/PRECAUTIONS:osteopenia    Exercises/Interventions:     Therapeutic Ex (07401) Sets/Reps/ sec Notes/CUES   Cat/Camel HEP   Tailwag (quadruped side bending) HEP Hooklying LE rotation HEP   Hooklying pelvic tilts 2x10 HEP                                           Manual Intervention (38914)                                   NMR re-education (79054)  CUES NEEDED   TA isometric 5 min Edu, breathing   TA marching 2x3x3 HEP 1/4/21   TA knee fallout 2x10 HEP 1/4   TA heel slide 2x10 HEP 1/4                            Therapeutic Activity (52839)                                       Therapeutic Exercise and NMR EXR  [x] (90834) Provided verbal/tactile cueing for activities related to strengthening, flexibility, endurance, ROM  for improvements in proximal hip and core control with self care, mobility, lifting and ambulation.  [] (76213) Provided verbal/tactile cueing for activities related to improving balance, coordination, kinesthetic sense, posture, motor skill, proprioception  to assist with core control in self care, mobility, lifting, and ambulation. Therapeutic Activities:    [] (14088 or 82526) Provided verbal/tactile cueing for activities related to improving balance, coordination, kinesthetic sense, posture, motor skill, proprioception and motor activation to allow for proper function  with self care and ADLs  [] (28583) Provided training and instruction to the patient for proper core and proximal hip recruitment and positioning with ambulation re-education     Home Exercise Program:    [x] (39674) Reviewed/Progressed HEP activities related to strengthening, flexibility, endurance, ROM of core, proximal hip and LE for functional self-care, mobility, lifting and ambulation   [] (24891) Reviewed/Progressed HEP activities related to improving balance, coordination, kinesthetic sense, posture, motor skill, proprioception of core, proximal hip and LE for self care, mobility, lifting, and ambulation      Access Code: [email or web address]   URL: [email or web address]   Date: 01/04/2021   Prepared by: Geovanna Banks     Exercises 2. Patient will demonstrate increased AROM to WNL, good LS mobility, good hip ROM to allow for proper joint functioning as indicated by patients Functional Deficits. [] Progressing: [] Met: [] Not Met: [] Adjusted  3. Patient will demonstrate an increase in Strength to good proximal hip and core activation to allow for proper functional mobility as indicated by patients Functional Deficits. [] Progressing: [] Met: [] Not Met: [] Adjusted  4. Patient will be able turn and twist with ADL movement without increased symptoms or restriction. [] Progressing: [] Met: [] Not Met: [] Adjusted  5. Pt will be able to sleep undisturbed by pain and without restrictive pain or tightness upon waking. [] Progressing: [] Met: [] Not Met: [] Adjusted       Progression Towards Functional goals:  [] Patient is progressing as expected towards functional goals listed. [] Progression is slowed due to complexities listed. [] Progression has been slowed due to co-morbidities. [x] Plan just implemented, too soon to assess goals progression  [] Other:     Overall Progression Towards Functional goals/ Treatment Progress Update:  [] Patient is progressing as expected towards functional goals listed. [] Progression is slowed due to complexities/Impairments listed. [] Progression has been slowed due to co-morbidities.   [x] Plan just implemented, too soon to assess goals progression <30days   [] Goals require adjustment due to lack of progress  [] Patient is not progressing as expected and requires additional follow up with physician  [] Other    Prognosis for POC: [x] Good [] Fair  [] Poor      Patient requires continued skilled intervention: [x] Yes  [] No    Treatment/Activity Tolerance:  [x] Patient able to complete treatment  [] Patient limited by fatigue  [] Patient limited by pain    [] Patient limited by other medical complications  [] Other: ASSESSMENT: Pt is already seeing some progress with PT. We initiated trans verse ab strengthening which pt found challenging and needed max concentration to be effective in recruiting trans ab without rectus ab or holding breath. By end of session though was able to more independently recruit trans ab and therefore we added these exercises to her HEP. PLAN: See eval. Pt to be seen 2 times a week for 6 weeks. Next visit: progress trans ab to SLR possibly to \"L\", S/L leg circles, clamshell, sit to stand keeping pelvic floor engaged  [x] Continue per plan of care [] Alter current plan (see comments above)  [] Plan of care initiated [] Hold pending MD visit [] Discharge      Electronically signed by:  Monica Teague, PT    Note: If patient does not return for scheduled/ recommended follow up visits, this note will serve as a discharge from care along with most recent update on progress.

## 2021-01-06 ENCOUNTER — HOSPITAL ENCOUNTER (OUTPATIENT)
Dept: PHYSICAL THERAPY | Age: 76
Setting detail: THERAPIES SERIES
Discharge: HOME | End: 2021-01-06
Payer: MEDICARE

## 2021-01-06 PROCEDURE — 97140 MANUAL THERAPY 1/> REGIONS: CPT

## 2021-01-06 PROCEDURE — 97110 THERAPEUTIC EXERCISES: CPT

## 2021-01-06 NOTE — FLOWSHEET NOTE
The [facility] Orthopaedics and Sports Rehabilitation; [facility]         Physical Therapy Treatment Note/ Progress Report:           Date:  2021    Patient Name:  Guillermina Funez    :  1945  MRN: [id number]  Restrictions/Precautions:    Medical/Treatment Diagnosis Information:  · Diagnosis: M47.816 spondylosis w/o radiculopathy, M51.36 lumbar DDD, M43.16 lumbar spondylolithesis  · Treatment Diagnosis: M54.5 LBP, M99.03 segmental & somatic dysfunction, M62.81 abdominal weakness  Insurance/Certification information:  PT Insurance Information: Aetna, no auth, med [place] Shearing, no telehealth  Physician Information:  Referring Practitioner: Dr. Orquidea Monae  Has the plan of care been signed (Y/N):        []  Yes  [x]  No     Date of Patient follow up with Physician: as needed      Is this a Progress Report:     []  Yes  [x]  No        If Yes:  Date Range for reporting period:  Beginning 20  Ending    Progress report will be due (10 Rx or 30 days whichever is less):        Recertification will be due (POC Duration  / 90 days whichever is less): 2/10/21        Visit # Insurance Allowable Auth Required   In-person 3/16,  Med nec []  Yes [x]  No    Telehealth no Not covered []  Yes []  No    Total 3/16, 7/99           Functional Scale: Mod Oswestry: 10% disability   Date assessed:  20        Number of Comorbidities:  []0     [x]1-2    []3+    Latex Allergy:  [x]NO      []YES  Preferred Language for Healthcare:   [x]English       []other:      Pain level:  2/10    SUBJECTIVE: Pt says for whatever reason today the right side of her back is a little more sore and feels twinges. The pillow under her side when sleeping though is still helping to reduce her morning pain. OBJECTIVE:   ? Observation: P-A mobility WNL lumbar spine, R mid-paraspinals tender and local spasm  ?  Test measurements:      RESTRICTIONS/PRECAUTIONS: osteopenia    Exercises/Interventions: Therapeutic Ex (16698) Sets/Reps/ sec Notes/CUES   Cat/Camel HEP   Tailwag (quadruped side bending) HEP   Hooklying LE rotation HEP   Hooklying pelvic tilts 2x10 HEP   TA leg circles 2x10 CW/CCW HEP 1/6   TA SLR \"L\" 2x10 HEP 1/6   S/L TA leg [place] 2x10 CW/CCW HEP 1/6   Clamshell 2x10 B HEP 1/6                            Manual Intervention (71172)     STM: R paraspinals 10 min                             NMR re-education (58921)  CUES NEEDED   TA isometric  Edu, breathing   TA marching 1x10 HEP 1/4/21   TA knee fallout 1x10 HEP 1/4   TA heel slide  HEP 1/4                            Therapeutic Activity (77407)     Sit <>Stand with TA and pelvic floor engaged 1x10                                 Therapeutic Exercise and NMR EXR  [x] (04244) Provided verbal/tactile cueing for activities related to strengthening, flexibility, endurance, ROM  for improvements in proximal hip and core control with self care, mobility, lifting and ambulation.  [] (97269) Provided verbal/tactile cueing for activities related to improving balance, coordination, kinesthetic sense, posture, motor skill, proprioception  to assist with core control in self care, mobility, lifting, and ambulation.      Therapeutic Activities:    [] (89275 or 71367) Provided verbal/tactile cueing for activities related to improving balance, coordination, kinesthetic sense, posture, motor skill, proprioception and motor activation to allow for proper function  with self care and ADLs  [] (38824) Provided training and instruction to the patient for proper core and proximal hip recruitment and positioning with ambulation re-education     Home Exercise Program:    [x] (43695) Reviewed/Progressed HEP activities related to strengthening, flexibility, endurance, ROM of core, proximal hip and LE for functional self-care, mobility, lifting and ambulation [] (42966) Reviewed/Progressed HEP activities related to improving balance, coordination, kinesthetic sense, posture, motor skill, proprioception of core, proximal hip and LE for self care, mobility, lifting, and ambulation      Access Code: [email or web address]   URL: [email or web address]   Date: 01/06/2021   Prepared by: Josie Kin     Exercises   Beginner Single Leg [place] - 10 reps - 2 sets - 1x daily - 7x weekly   Supine Pelvic Tilt with Straight Leg Raise - 10 reps - 2 sets - 1x daily - 7x weekly   Beginner Sidelying Leg [place] - 10 reps - 2 sets - 1x daily - 7x weekly   Clamshell - 10 reps - 2 sets - 1x daily - 7x weekly       Manual Treatments:  PROM / STM / Oscillations-Mobs:  G-I, II, III, IV (PA's, Inf., Post.)  [x] (47450) Provided manual therapy to mobilize proximal hip and LS spine soft tissue/joints for the purpose of modulating pain, promoting relaxation,  increasing ROM, reducing/eliminating soft tissue swelling/inflammation/restriction, improving soft tissue extensibility and allowing for proper ROM for normal function with self care, mobility, lifting and ambulation. Modalities:       Charges:  Timed Code Treatment Minutes: 45 min   Total Treatment Minutes: 45 min       [] EVAL (LOW) 75624   [] EVAL (MOD) 71614   [] EVAL (HIGH) 66783   [] RE-EVAL     [x] CY(75310) x 2     [] IONTO  [] NMR (69993) x     [] VASO  [x] Manual (80135) x 1     [] Other:  [] TA x      [] Mech Traction (21746)  [] ES(attended) (14046)      [] ES (un) (05630):     Goals:  Patient stated goal:improve comfort especially when sleeping  [x] Progressing: [] Met: [] Not Met: [] Adjusted    Therapist goals for Patient:   Short Term Goals: To be achieved in: 2 weeks  1. Independent in HEP and progression per patient tolerance, in order to prevent re-injury.    [] Progressing: [] Met: [] Not Met: [] Adjusted 2. Patient will have a decrease in pain to facilitate improvement in movement, function, and ADLs as indicated by Functional Deficits. [] Progressing: [] Met: [] Not Met: [] Adjusted      Long Term Goals: To be achieved in: 6 weeks  1. Disability index score of 5% or less for the Mod Oswestry  to assist with reaching prior level of function. [] Progressing: [] Met: [] Not Met: [] Adjusted  2. Patient will demonstrate increased AROM to WNL, good LS mobility, good hip ROM to allow for proper joint functioning as indicated by patients Functional Deficits. [] Progressing: [] Met: [] Not Met: [] Adjusted  3. Patient will demonstrate an increase in Strength to good proximal hip and core activation to allow for proper functional mobility as indicated by patients Functional Deficits. [] Progressing: [] Met: [] Not Met: [] Adjusted  4. Patient will be able turn and twist with ADL movement without increased symptoms or restriction. [] Progressing: [] Met: [] Not Met: [] Adjusted  5. Pt will be able to sleep undisturbed by pain and without restrictive pain or tightness upon waking. [] Progressing: [] Met: [] Not Met: [] Adjusted       Progression Towards Functional goals:  [] Patient is progressing as expected towards functional goals listed. [] Progression is slowed due to complexities listed. [] Progression has been slowed due to co-morbidities. [x] Plan just implemented, too soon to assess goals progression  [] Other:     Overall Progression Towards Functional goals/ Treatment Progress Update:  [] Patient is progressing as expected towards functional goals listed. [] Progression is slowed due to complexities/Impairments listed. [] Progression has been slowed due to co-morbidities.   [x] Plan just implemented, too soon to assess goals progression <30days   [] Goals require adjustment due to lack of progress  [] Patient is not progressing as expected and requires additional follow up with physician [] Other    Prognosis for POC: [x] Good [] Fair  [] Poor      Patient requires continued skilled intervention: [x] Yes  [] No    Treatment/Activity Tolerance:  [x] Patient able to complete treatment  [] Patient limited by fatigue  [] Patient limited by pain    [] Patient limited by other medical complications  [] Other:     ASSESSMENT: Pt felt much better after the STM and able to do new exercise progression pain-free. PLAN: See eval. Pt to be seen 2 times a week for 6 weeks. Next visit: trans ab seated on Tball progression and paloff press  [x] Continue per plan of care [] Alter current plan (see comments above)  [] Plan of care initiated [] Hold pending MD visit [] Discharge      Electronically signed by:  Josie Sanderson PT    Note: If patient does not return for scheduled/ recommended follow up visits, this note will serve as a discharge from care along with most recent update on progress.

## 2021-01-11 ENCOUNTER — HOSPITAL ENCOUNTER (OUTPATIENT)
Dept: PHYSICAL THERAPY | Age: 76
Setting detail: THERAPIES SERIES
Discharge: HOME | End: 2021-01-11
Payer: MEDICARE

## 2021-01-11 PROCEDURE — 97110 THERAPEUTIC EXERCISES: CPT

## 2021-01-11 NOTE — FLOWSHEET NOTE
The [facility] Orthopaedics and Sports Rehabilitation; [place]         Physical Therapy Treatment Note/ Progress Report:           Date:  2021    Patient Name:  Sheela Singh    :  1945  MRN: [id number]  Restrictions/Precautions:    Medical/Treatment Diagnosis Information:  · Diagnosis: M47.816 spondylosis w/o radiculopathy, M51.36 lumbar DDD, M43.16 lumbar spondylolithesis  · Treatment Diagnosis: M54.5 LBP, M99.03 segmental & somatic dysfunction, M62.81 abdominal weakness  Insurance/Certification information:  PT Insurance Information: Aetna, no auth, med Lacey Hockey, no telehealth  Physician Information:  Referring Practitioner: Dr. Karen Manrique  Has the plan of care been signed (Y/N):        []  Yes  [x]  No     Date of Patient follow up with Physician: as needed      Is this a Progress Report:     []  Yes  [x]  No        If Yes:  Date Range for reporting period:  Beginning 20  Ending    Progress report will be due (10 Rx or 30 days whichever is less): 3/54/51       Recertification will be due (POC Duration  / 90 days whichever is less): 2/10/21        Visit # Insurance Allowable Auth Required   In-person ,  Med nec []  Yes [x]  No    Telehealth no Not covered []  Yes []  No    Total            Functional Scale: Mod Oswestry: 10% disability   Date assessed:  20        Number of Comorbidities:  []0     [x]1-2    []3+    Latex Allergy:  [x]NO      []YES  Preferred Language for Healthcare:   [x]English       []other:     Pain level:  0/10    SUBJECTIVE: Pt says she felt fine after last session. She feels like she is ready for a strength progression as her HEP is getting easier to do. OBJECTIVE:   ? Observation: P-A mobility WNL lumbar spine, R mid-paraspinals tender and local spasm  ? Test measurements:   Forward flex 100% and equal mobility from all segments which is much improved from eval    RESTRICTIONS/PRECAUTIONS: osteopenia Exercises/Interventions:     Therapeutic Ex (37831) Sets/Reps/ sec Notes/CUES   Cat/Camel HEP   Tailwag (quadruped side bending) HEP   Hooklying LE rotation HEP   Hooklying pelvic tilts HEP   TA leg circles HEP 1/6   TA SLR \"L\" HEP 1/6   S/L TA leg [place]  HEP 1/6   Clamshell w/ feet up 2x10 B HEP 1/6   Paloff press 2x10 green    TA low row w/ step-up 10\" green 2x10 R/L                   Manual Intervention (56076)     STM: R paraspinals 10 min                             NMR re-education (49762)  CUES NEEDED   TA isometric  Edu, breathing   TA marching HEP 1/4/21   TA knee fallout HEP 1/4   TA heel slide  HEP 1/4   TA theraball marching 1x10    TA theraball UE nv    TA theraball march + UE nv              Therapeutic Activity (57403)     Sit <>Stand with TA and pelvic floor engaged, overhead press 2x10, 6 lbs                                 Therapeutic Exercise and NMR EXR  [x] (79047) Provided verbal/tactile cueing for activities related to strengthening, flexibility, endurance, ROM  for improvements in proximal hip and core control with self care, mobility, lifting and ambulation.  [] (81335) Provided verbal/tactile cueing for activities related to improving balance, coordination, kinesthetic sense, posture, motor skill, proprioception  to assist with core control in self care, mobility, lifting, and ambulation.      Therapeutic Activities:    [] (70075 or 64289) Provided verbal/tactile cueing for activities related to improving balance, coordination, kinesthetic sense, posture, motor skill, proprioception and motor activation to allow for proper function  with self care and ADLs  [] (27199) Provided training and instruction to the patient for proper core and proximal hip recruitment and positioning with ambulation re-education     Home Exercise Program: [x] (62828) Reviewed/Progressed HEP activities related to strengthening, flexibility, endurance, ROM of core, proximal hip and LE for functional self-care, mobility, lifting and ambulation   [] (11743) Reviewed/Progressed HEP activities related to improving balance, coordination, kinesthetic sense, posture, motor skill, proprioception of core, proximal hip and LE for self care, mobility, lifting, and ambulation      Access Code: [email or web address]   URL: [email or web address]   Date: 01/11/2021   Prepared by: Alea Quezada     Exercises   Sidelying Feet Elevated Clamshells - 10 reps - 2 sets - 1x daily - 7x weekly   Standing Anti-Rotation Press with Anchored Resistance - 10 reps - 2 sets - 1x daily - 7x weekly   Step Up with Shoulder Extension and Resistance - 10 reps - 2 sets - 1x daily - 7x weekly   Sit to Stand with Pelvic Floor Contraction - 10 reps - 2 sets - 1x daily - 7x weekly     Manual Treatments:  PROM / STM / Oscillations-Mobs:  G-I, II, III, IV (PA's, Inf., Post.)  [] (86572) Provided manual therapy to mobilize proximal hip and LS spine soft tissue/joints for the purpose of modulating pain, promoting relaxation,  increasing ROM, reducing/eliminating soft tissue swelling/inflammation/restriction, improving soft tissue extensibility and allowing for proper ROM for normal function with self care, mobility, lifting and ambulation. Modalities:       Charges:  Timed Code Treatment Minutes: 45 min   Total Treatment Minutes: 45 min       [] EVAL (LOW) 10887   [] EVAL (MOD) 01343   [] EVAL (HIGH) 45230   [] RE-EVAL     [x] UP(19945) x 3     [] IONTO  [] NMR (16981) x     [] VASO  [] Manual (54068) x      [] Other:  [] TA x      [] Mech Traction (21145)  [] ES(attended) (80003)      [] ES (un) (05250):     Goals:  Patient stated goal:improve comfort especially when sleeping  [x] Progressing: [] Met: [] Not Met: [] Adjusted    Therapist goals for Patient:   Short Term Goals:  To be achieved in: 2 weeks 1. Independent in HEP and progression per patient tolerance, in order to prevent re-injury. [] Progressing: [x] Met: [] Not Met: [] Adjusted  2. Patient will have a decrease in pain to facilitate improvement in movement, function, and ADLs as indicated by Functional Deficits. [] Progressing: [x] Met: [] Not Met: [] Adjusted      Long Term Goals: To be achieved in: 6 weeks  1. Disability index score of 5% or less for the Mod Oswestry  to assist with reaching prior level of function. [] Progressing: [] Met: [] Not Met: [] Adjusted  2. Patient will demonstrate increased AROM to WNL, good LS mobility, good hip ROM to allow for proper joint functioning as indicated by patients Functional Deficits. [] Progressing: [x] Met: [] Not Met: [] Adjusted  3. Patient will demonstrate an increase in Strength to good proximal hip and core activation to allow for proper functional mobility as indicated by patients Functional Deficits. [] Progressing: [] Met: [] Not Met: [] Adjusted  4. Patient will be able turn and twist with ADL movement without increased symptoms or restriction. [x] Progressing: [] Met: [] Not Met: [] Adjusted  5. Pt will be able to sleep undisturbed by pain and without restrictive pain or tightness upon waking. [x] Progressing: [] Met: [] Not Met: [] Adjusted       Progression Towards Functional goals:  [x] Patient is progressing as expected towards functional goals listed. [] Progression is slowed due to complexities listed. [] Progression has been slowed due to co-morbidities. [] Plan just implemented, too soon to assess goals progression  [] Other:     Overall Progression Towards Functional goals/ Treatment Progress Update:  [x] Patient is progressing as expected towards functional goals listed. [] Progression is slowed due to complexities/Impairments listed. [] Progression has been slowed due to co-morbidities.   [] Plan just implemented, too soon to assess goals progression <30days [] Goals require adjustment due to lack of progress  [] Patient is not progressing as expected and requires additional follow up with physician  [] Other    Prognosis for POC: [x] Good [] Fair  [] Poor      Patient requires continued skilled intervention: [x] Yes  [] No    Treatment/Activity Tolerance:  [x] Patient able to complete treatment  [] Patient limited by fatigue  [] Patient limited by pain    [] Patient limited by other medical complications  [] Other:     ASSESSMENT: Pt able to progress to more upright and functional exercise patterns today. She had a little harder time coordinating TA with these larger movements and with breathing but did well enough to work on indep with HEP. PLAN: See eval. Pt to be seen 2 times a week for 6 weeks. Next visit: trans ab seated on Tball or DD progression  [x] Continue per plan of care [] Alter current plan (see comments above)  [] Plan of care initiated [] Hold pending MD visit [] Discharge      Electronically signed by:  Laurie Sears PT    Note: If patient does not return for scheduled/ recommended follow up visits, this note will serve as a discharge from care along with most recent update on progress.

## 2021-01-13 ENCOUNTER — HOSPITAL ENCOUNTER (OUTPATIENT)
Dept: PHYSICAL THERAPY | Age: 76
Setting detail: THERAPIES SERIES
Discharge: HOME | End: 2021-01-13
Payer: MEDICARE

## 2021-01-13 PROCEDURE — 97110 THERAPEUTIC EXERCISES: CPT

## 2021-01-13 PROCEDURE — 97112 NEUROMUSCULAR REEDUCATION: CPT

## 2021-01-13 NOTE — FLOWSHEET NOTE
[facility] [address] [place] [address] [facility]    [facility] Therapy Daily Treatment Note  Date:  2019    Patient Name:  Farhan Carrizales    :  1945  MRN: [id number]  Restrictions/Precautions:    Medical/Treatment Diagnosis Information:  · Diagnosis: Low back pain, lumbar DDD   M54.5  · Treatment Diagnosis: PT treatment diagnosis:  low back pain  Insurance/Certification information:  PT Insurance Information: [facility] [address] Medicare  visits based on medical necessity,  $0 copay  Physician Information:  Referring Practitioner: Dr Joey Alexandra of care signed (Y/N):     Date of Patient follow up with Physician:     G-Code (if applicable):      Date G-Code Applied:    PT G-Codes  Functional Assessment Tool Used: Modified TOOTIE  Score: 8%    Progress Note: []  Yes  []  No  Next due by: Visit #10      Latex Allergy:  [x]NO      []YES  Preferred Language for Healthcare:   [x]English       []other:    Visit # Insurance Allowable   2 BMN       Auth Required   []  Yes    [] No    Visits Approved  Date Ranged-       Pain level:  0/10     SUBJECTIVE:  States the low back feel stiff today. Went to a water aerobics class earlier today and back has been tight since then. Reports no problems with HEP.       OBJECTIVE: See eval  Observation:   Test measurements:      RESTRICTIONS/PRECAUTIONS: Osteopenia    Exercises/Interventions:       Script:  19  Exercise/Equipment Sets/Reps Notes Last Progression   Hand Knee Rock      Prone Press Up      LTR w/ crossed legs 3x30'' B     Piriformis Cross Over/Figure 4 piriformis Stretch      SKC      DKC      Prone Quad Stretch       Hamstring Stretch 3x30'' B manual    Quadruped UE      Quadruped LE      Quadruped UE/LE combined (birddog)       TA bracing: circles 6# ball 15x each     TA 90-90 heel taps 15x B     Bridge 2x10     SLR Flex      SLR Abd 2x10 B     Prone hip ext 2x10 B     Prone donkey kick 2x10 B     Clamshells [place] loop 30x B     Prone plank Side plank      Squats      Standing Stretch (insert muscle)            SB supine walk outs 5x10''     SB seated chop/lift 4# 10x B     SB seated twists 2# 10x B           Manual traction 5x30'' gentle              Therapeutic Exercise and NMR EXR  [x] (56772) Provided verbal/tactile cueing for activities related to strengthening, flexibility, endurance, ROM  for improvements in proximal hip and core control with self care, mobility, lifting and ambulation. [x] (16940) Provided verbal/tactile cueing for activities related to improving balance, coordination, kinesthetic sense, posture, motor skill, proprioception  to assist with core control in self care, mobility, lifting, and ambulation.       Therapeutic Activities:    [] (61053 or 72953) Provided verbal/tactile cueing for activities related to improving balance, coordination, kinesthetic sense, posture, motor skill, proprioception and motor activation to allow for proper function  with self care and ADLs  [] (42068) Provided training and instruction to the patient for proper core and proximal hip recruitment and positioning with ambulation re-education     Home Exercise Program:    [x] (33473) Reviewed/Progressed HEP activities related to strengthening, flexibility, endurance, ROM of core, proximal hip and LE for functional self-care, mobility, lifting and ambulation   [] (32175) Reviewed/Progressed HEP activities related to improving balance, coordination, kinesthetic sense, posture, motor skill, proprioception of core, proximal hip and LE for self care, mobility, lifting, and ambulation      Manual Treatments: Traction / PA's (Gr I, II, III, IV) / Stretch- Hamstring, Piriformis, Hip Flexor, Groin / STM/ Sacral Decompression  [x] Provided manual therapy to mobilize soft tissue/joints for the purpose of modulating pain, promoting relaxation, increasing ROM, reducing/eliminating soft tissue swelling/inflammation/restriction, improving soft tissue extensibility and allowing for proper ROM for normal function. (96729). Modalities:       Charges:   Timed Code Treatment Minutes: 30   Total Treatment Minutes: 30     [] EVAL (LOW) 10040 (typically 20 minutes face-to-face)  [] EVAL (MOD) 87038 (typically 30 minutes face-to-face)  [] EVAL (HIGH) 75460 (typically 45 minutes face-to-face)  [] RE-EVAL     [x] WT(68274) x  2   [] IONTO  [x] NMR (42764) x  1   [] VASO  [] Manual (82094) x       [] Other:  [] TA x       [] Mech Traction (45395)  [] ES(attended) (54028)      [] ES (un) (75891):     Goals:   Short Term Goals: To be achieved in: 2 weeks  1. Independent in HEP and progression per patient tolerance, in order to prevent re-injury. 2. Patient will have a decrease in pain to facilitate improvement in movement, function, and ADLs as indicated by Functional Deficits. Long Term Goals: To be achieved in: 4 weeks  1. Disability index score of 0% for the TOOTIE to assist with reaching prior level of function. 2. Patient will demonstrate increased AROM to WNL, good LS mobility, good hip ROM to allow for proper joint functioning as indicated by patients Functional Deficits. 3. Patient will demonstrate an increase in Strength to good proximal hip and core activation to allow for proper functional mobility as indicated by patients Functional Deficits. 4. Patient will return to bending forward to put on shoes, get in/out of car, functional activities without increased symptoms or restriction. Progression Towards Functional goals:  [] Patient is progressing as expected towards functional goals listed. [] Progression is slowed due to complexities listed. [] Progression has been slowed due to co-morbidities. [x] Plan just implemented, too soon to assess goals progression  [] Other:     ASSESSMENT:  Tolerated progression of exercises without increase in pain symptoms. VC required to maintain engagement of core throughout exercise routine.      Treatment/Activity Tolerance:  [x] Patient tolerated treatment well [] Patient limited by fatique  [] Patient limited by pain  [] Patient limited by other medical complications  [] Other:     Prognosis: [x] Good [] Fair  [] Poor    Patient Requires Follow-up: [x] Yes  [] No    PLAN FOR NEXT SESSION:     PLAN: See eval  [x] Continue per plan of care [] Alter current plan (see comments)  [] Plan of care initiated [] Hold pending MD visit [] Discharge    *If patient does not return for further follow ups after this date. Please consider this as the patients discharge from physical therapy.      Electronically signed by: Kendrick Jonas PT [id number] Patient/EMS

## 2021-01-13 NOTE — FLOWSHEET NOTE
The [address]; Orthopaedics and Sports Rehabilitation; [facility]         Physical Therapy Treatment Note/ Progress Report:           Date:  2021    Patient Name:  Zoë Bautista    :  1945  MRN: [id number]  Restrictions/Precautions:    Medical/Treatment Diagnosis Information:  · Diagnosis: M47.816 spondylosis w/o radiculopathy, M51.36 lumbar DDD, M43.16 lumbar spondylolithesis  · Treatment Diagnosis: M54.5 LBP, M99.03 segmental & somatic dysfunction, M62.81 abdominal weakness  Insurance/Certification information:  PT Insurance Information: Aetna, no auth, med Derek Cuate, no telehealth  Physician Information:  Referring Practitioner: Dr. Isabella Zafar  Has the plan of care been signed (Y/N):        []  Yes  [x]  No     Date of Patient follow up with Physician: as needed      Is this a Progress Report:     []  Yes  [x]  No        If Yes:  Date Range for reporting period:  Beginning 20  Ending    Progress report will be due (10 Rx or 30 days whichever is less):        Recertification will be due (POC Duration  / 90 days whichever is less): 2/10/21        Visit # Insurance Allowable Auth Required   In-person ,  Med nec []  Yes [x]  No    Telehealth no Not covered []  Yes []  No    Total            Functional Scale: Mod Oswestry: 10% disability   Date assessed:  20        Number of Comorbidities:  []0     [x]1-2    []3+    Latex Allergy:  [x]NO      []YES  Preferred Language for Healthcare:   [x]English       []other:     Pain level:  0/10    SUBJECTIVE: Pt considers herself 80-85% improved. She is much less pain and stiffness in the morning and having less frequent and less severe \"twing\" pain when she twists or does something. Pt has found she is more mindful of using her core particularly during her water aerobics class.     OBJECTIVE:   ? Observation: P-A mobility WNL lumbar spine, R mid-paraspinals tender and local spasm ? Test measurements: Forward flex 100% and equal mobility from all segments which is much improved from eval    RESTRICTIONS/PRECAUTIONS: osteopenia    Exercises/Interventions:     Therapeutic Ex (14716) Sets/Reps/ sec Notes/CUES   Cat/Camel HEP   Tailwag (quadruped side bending) HEP   Hooklying LE rotation HEP   Hooklying pelvic tilts HEP   TA leg circles HEP 1/6   TA SLR \"L\" HEP 1/6   S/L TA leg [place]  HEP 1/6   Clamshell w/ feet up HEP 1/6   Paloff press    TA low row w/ step-up    Side stepping, TA Red 2x15 ft    Ref: TA, scap retract, row, low row, high row 1R1B 1x10ea                        Manual Intervention (62160)     STM: R paraspinals                              NMR re-education (50714)  CUES NEEDED   TA isometric  Edu, breathing   TA marching HEP 1/4/21   TA knee fallout HEP 1/4   TA heel slide  HEP 1/4   TA theraball marching    TA theraball UE    TA theraball march + UE    DD on chair, TA marching 2x10    DD on chair, TA, UE 2x20    DD on chair, TA, heel raises 2x10    DD on chair, TA, march + UE 2x10              Therapeutic Activity (97280)     Sit <>Stand with TA and pelvic floor engaged, overhead press                                  Therapeutic Exercise and NMR EXR  [x] (67719) Provided verbal/tactile cueing for activities related to strengthening, flexibility, endurance, ROM  for improvements in proximal hip and core control with self care, mobility, lifting and ambulation. [x] (02960) Provided verbal/tactile cueing for activities related to improving balance, coordination, kinesthetic sense, posture, motor skill, proprioception  to assist with core control in self care, mobility, lifting, and ambulation.      Therapeutic Activities:    [] (97762 or 89506) Provided verbal/tactile cueing for activities related to improving balance, coordination, kinesthetic sense, posture, motor skill, proprioception and motor activation to allow for proper function  with self care and ADLs [] (48260) Provided training and instruction to the patient for proper core and proximal hip recruitment and positioning with ambulation re-education     Home Exercise Program:    [x] (01591) Reviewed/Progressed HEP activities related to strengthening, flexibility, endurance, ROM of core, proximal hip and LE for functional self-care, mobility, lifting and ambulation   [] (22601) Reviewed/Progressed HEP activities related to improving balance, coordination, kinesthetic sense, posture, motor skill, proprioception of core, proximal hip and LE for self care, mobility, lifting, and ambulation      Access Code: [email or web address]   URL: [employer]/   Date: 01/13/2021   Prepared by: Margarita Alcantara     Exercises   Swiss Ball March - 10 reps - 2 sets - 1x daily - 7x weekly   Seated Pelvic Floor Contraction on Swiss Ball - 10 reps - 2 sets - 1x daily - 7x weekly   Alternating Shoulder Flexion Seated on Swiss Ball - 10 reps - 2 sets - 1x daily - 7x weekly   Seated March with Opposite Arm Flexion on Swiss Ball - 10 reps - 2 sets - 1x daily - 7x weekly     Manual Treatments:  PROM / STM / Oscillations-Mobs:  G-I, II, III, IV (PA's, Inf., Post.)  [] (40850) Provided manual therapy to mobilize proximal hip and LS spine soft tissue/joints for the purpose of modulating pain, promoting relaxation,  increasing ROM, reducing/eliminating soft tissue swelling/inflammation/restriction, improving soft tissue extensibility and allowing for proper ROM for normal function with self care, mobility, lifting and ambulation.      Modalities:       Charges:  Timed Code Treatment Minutes: 45 min   Total Treatment Minutes: 45 min       [] EVAL (LOW) 54232   [] EVAL (MOD) 78010   [] EVAL (HIGH) 77992   [] RE-EVAL     [x] AL(72015) x 2     [] IONTO  [x] NMR (13011) x 1     [] VASO  [] Manual (18770) x      [] Other:  [] TA x      [] Mech Traction (85994)  [] ES(attended) (50282)      [] ES (un) (99955):     Goals: Patient stated goal:improve comfort especially when sleeping  [x] Progressing: [] Met: [] Not Met: [] Adjusted    Therapist goals for Patient:   Short Term Goals: To be achieved in: 2 weeks  1. Independent in HEP and progression per patient tolerance, in order to prevent re-injury. [] Progressing: [x] Met: [] Not Met: [] Adjusted  2. Patient will have a decrease in pain to facilitate improvement in movement, function, and ADLs as indicated by Functional Deficits. [] Progressing: [x] Met: [] Not Met: [] Adjusted      Long Term Goals: To be achieved in: 6 weeks  1. Disability index score of 5% or less for the Mod Oswestry  to assist with reaching prior level of function. [] Progressing: [] Met: [] Not Met: [] Adjusted  2. Patient will demonstrate increased AROM to WNL, good LS mobility, good hip ROM to allow for proper joint functioning as indicated by patients Functional Deficits. [] Progressing: [x] Met: [] Not Met: [] Adjusted  3. Patient will demonstrate an increase in Strength to good proximal hip and core activation to allow for proper functional mobility as indicated by patients Functional Deficits. [] Progressing: [] Met: [] Not Met: [] Adjusted  4. Patient will be able turn and twist with ADL movement without increased symptoms or restriction. [x] Progressing: [] Met: [] Not Met: [] Adjusted  5. Pt will be able to sleep undisturbed by pain and without restrictive pain or tightness upon waking. [x] Progressing: [] Met: [] Not Met: [] Adjusted       Progression Towards Functional goals:  [x] Patient is progressing as expected towards functional goals listed. [] Progression is slowed due to complexities listed. [] Progression has been slowed due to co-morbidities. [] Plan just implemented, too soon to assess goals progression  [] Other:     Overall Progression Towards Functional goals/ Treatment Progress Update:  [x] Patient is progressing as expected towards functional goals listed. [] Progression is slowed due to complexities/Impairments listed. [] Progression has been slowed due to co-morbidities. [] Plan just implemented, too soon to assess goals progression <30days   [] Goals require adjustment due to lack of progress  [] Patient is not progressing as expected and requires additional follow up with physician  [] Other    Prognosis for POC: [x] Good [] Fair  [] Poor      Patient requires continued skilled intervention: [x] Yes  [] No    Treatment/Activity Tolerance:  [x] Patient able to complete treatment  [] Patient limited by fatigue  [] Patient limited by pain    [] Patient limited by other medical complications  [] Other:     ASSESSMENT: Pt is doing better with self awareness with use of TA and controlling breathing during more upright and functional exercises. PLAN: See eval. Pt to be seen 2 times a week for 6 weeks. Next visit: trans ab seated on Tball or DD progression  [x] Continue per plan of care [] Alter current plan (see comments above)  [] Plan of care initiated [] Hold pending MD visit [] Discharge      Electronically signed by:  Monica Teague, PT    Note: If patient does not return for scheduled/ recommended follow up visits, this note will serve as a discharge from care along with most recent update on progress.

## 2021-01-14 ENCOUNTER — OFFICE VISIT (OUTPATIENT)
Dept: ORTHOPEDIC SURGERY | Age: 76
End: 2021-01-14
Payer: MEDICARE

## 2021-01-14 VITALS — BODY MASS INDEX: 21.95 KG/M2 | RESPIRATION RATE: 12 BRPM | WEIGHT: 123.9 LBS | TEMPERATURE: 96.9 F | HEIGHT: 63 IN

## 2021-01-14 DIAGNOSIS — M43.16 SPONDYLOLISTHESIS OF LUMBAR REGION: ICD-10-CM

## 2021-01-14 DIAGNOSIS — M47.816 SPONDYLOSIS WITHOUT MYELOPATHY OR RADICULOPATHY, LUMBAR REGION: Primary | ICD-10-CM

## 2021-01-14 DIAGNOSIS — M51.36 DEGENERATIVE DISC DISEASE, LUMBAR: ICD-10-CM

## 2021-01-14 PROCEDURE — 99213 OFFICE O/P EST LOW 20 MIN: CPT | Performed by: PHYSICAL MEDICINE & REHABILITATION

## 2021-01-14 NOTE — PROGRESS NOTES
Follow up: Deisy Shukla  1945  [id number]         Chief Complaint   Patient presents with    Lower Back Pain     [facility] PT - symptoms have improved. HISTORY OF PRESENT ILLNESS:  Ms. Noah Henriquez is a 70 y.o. female returns for a follow up visit for multiple medical problems. Her current presenting problems are   1. Spondylosis without myelopathy or radiculopathy, lumbar region    2. Degenerative disc disease, lumbar    3. Spondylolisthesis of lumbar region    . As per information/history obtained from the PADT(patient assessment and documentation tool) - She complains of pain in the lower back with radiation to the hips Bilateral She rates the pain 2/10 and describes it as throbbing. Pain is made worse by: movement. She denies side effects from the current pain regimen. Patient reports that since the last follow up visit the physical functioning is better, family/social relationships are unchanged, mood is unchanged and sleep patterns are unchanged, and that the overall functioning is better. Patient denies neurological bowel or bladder. She presents after trial of physical therapy. She has had significant improvement in her back pain. She used to have difficulty with getting up in the morning and her activities at that time. This has significantly improved. It is no longer toward wake up in the morning. Associated signs and symptoms:   Neurogenic bowel or bladder symptoms:  no   Perceived weakness:  no   Difficulty walking:  no            Past medical, surgical, social and family history reviewed with the patient.  No pertinent relevant history  Past Medical History:   Past Medical History:   Diagnosis Date    Dizziness     Joint pain       Past Surgical History:     Past Surgical History:   Procedure Laterality Date     SECTION      HAND SURGERY       Current Medications:     Current Outpatient Medications:     Estradiol (VAGIFEM VA), Place vaginally, Disp: , Rfl:     Lifitegrast (Miah Buffy OP), Apply to eye, Disp: , Rfl:     MECLIZINE HCL PO, Take by mouth, Disp: , Rfl:     VITAMIN E PO, Take by mouth, Disp: , Rfl:     VITAMIN D PO, Take by mouth, Disp: , Rfl:     B Complex-C (SUPER B COMPLEX PO), Take by mouth, Disp: , Rfl:     meloxicam (MOBIC) 15 MG tablet, Take 1 tablet by mouth daily, Disp: 30 tablet, Rfl: 0    raloxifene (EVISTA) 60 MG tablet, , Disp: , Rfl:     Pediatric Multivitamins-Fl (MULTI VIT/FL PO), Take  by mouth., Disp: , Rfl:     aspirin 81 MG chewable tablet, Take 81 mg by mouth daily. , Disp: , Rfl:     ValACYclovir HCl (VALTREX PO), Take  by mouth., Disp: , Rfl:   Allergies:  Patient has no known allergies. Social History:    reports that she has never smoked. She has never used smokeless tobacco. She reports current alcohol use. She reports that she does not use drugs. Family History:   Family History   Problem Relation Age of Onset    Heart Disease Father     Cancer Sister     Diabetes Paternal Aunt     Heart Disease Maternal Grandfather     Diabetes Paternal Grandfather     Heart Disease Paternal Grandfather        REVIEW OF SYSTEMS:   CONSTITUTIONAL: Denies unexplained weight loss, fevers, chills or fatigue  NEUROLOGICAL: Denies unsteady gait or progressive weakness  MUSCULOSKELETAL: Denies joint swelling or redness  GI: Denies nausea, vomiting, diarrhea   : Denies bowel or bladder issues       PHYSICAL EXAM:    Vitals: Temperature 96.9 °F (36.1 °C), temperature source Infrared, resp. rate 12, height 5' 2.52\" (1.588 m), weight 123 lb 14.4 oz (56.2 kg). GENERAL EXAM:  · General Apparence: Patient is adequately groomed with no evidence of malnutrition. · Psychiatric: Orientation: The patient is oriented to time, place and person. The patient's mood and affect are appropriate   · Vascular: Examination reveals no swelling and palpation reveals no tenderness in upper or lower extremities. Good capillary refill.    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 Degenerative disc disease, lumbar    3. Spondylolisthesis of lumbar region        Plan:  Clinical Course: Above diagnoses are improving     I discussed the diagnosis and the treatment options with nAtione Arvizu today. In Summary:  The various treatment options were outlined and discussed with Antione Arvizu including:  Conservative care options: physical therapy, ice, medications, bracing, and activity modification. The indications for therapeutic injections. The indications for additional imaging/laboratory studies. The indications for (possible future) interventions. After considering the various options discussed, Antione Arvizu elected to pursue a course of treatment that includes the followin. Medications:  Continue anti-inflammatories with appropriate GI Precautions including to stop if develop dark tarry stools or GI upset and to take with food. 2. PT:  Encouraged to continue with Home exercise program.    3. Further studies: MRI Lumbar spine in the future if her pain worsens. 4. Interventional:  None at this time    5. Follow up:  4-6 weeks      Antione Arvizu was instructed to call the office if her symptoms worsen or if new symptoms appear prior to the next scheduled visit. She is specifically instructed to contact the office between now & her scheduled appointment if she has concerns related to her condition or if she needs assistance in scheduling the above tests. She is welcome to call for an appointment sooner if she has any additional concerns or questions. Arturo Caro. Magnus Garcia MD, JHONATHAN, [address] [place] [address]  Board Certified in [address] [place] [address]  Certified and Fellowship Trained in [facility] ([facility])             This dictation was performed with a verbal recognition program [facility]) and it was checked for errors.  It is possible that there are still dictated errors within this office note. If so, please bring any errors to my attention for an addendum. All efforts were made to ensure that this office note is accurate.

## 2021-01-18 ENCOUNTER — APPOINTMENT (OUTPATIENT)
Dept: PHYSICAL THERAPY | Age: 76
End: 2021-01-18
Payer: MEDICARE

## 2021-01-20 ENCOUNTER — HOSPITAL ENCOUNTER (OUTPATIENT)
Dept: PHYSICAL THERAPY | Age: 76
Setting detail: THERAPIES SERIES
Discharge: HOME | End: 2021-01-20
Payer: MEDICARE

## 2021-01-20 PROCEDURE — 97112 NEUROMUSCULAR REEDUCATION: CPT

## 2021-01-20 PROCEDURE — 97110 THERAPEUTIC EXERCISES: CPT

## 2021-01-20 NOTE — FLOWSHEET NOTE
The [facility] Orthopaedics and Sports Rehabilitation; [facility]         Physical Therapy Treatment Note/ Progress Report:           Date:  2021    Patient Name:  Lore Montague    :  1945  MRN: [id number]  Restrictions/Precautions:    Medical/Treatment Diagnosis Information:  · Diagnosis: M47.816 spondylosis w/o radiculopathy, M51.36 lumbar DDD, M43.16 lumbar spondylolithesis  · Treatment Diagnosis: M54.5 LBP, M99.03 segmental & somatic dysfunction, M62.81 abdominal weakness  Insurance/Certification information:  PT Insurance Information: Aetna, no auth, med Nathanel Roa, no telehealth  Physician Information:  Referring Practitioner: Dr. Renner Mehul  Has the plan of care been signed (Y/N):        []  Yes  [x]  No     Date of Patient follow up with Physician: as needed      Is this a Progress Report:     []  Yes  [x]  No        If Yes:  Date Range for reporting period:  Beginning 20  Ending    Progress report will be due (10 Rx or 30 days whichever is less): 9/10/41       Recertification will be due (POC Duration  / 90 days whichever is less): 2/10/21        Visit # Insurance Allowable Auth Required   In-person ,  Med nec []  Yes [x]  No    Telehealth no Not covered []  Yes []  No    Total            Functional Scale:  Mod Oswestry: 10% disability   Date assessed:  20        Number of Comorbidities:  []0     [x]1-2    []3+    Latex Allergy:  [x]NO      []YES  Preferred Language for Healthcare:   [x]English       []other:     Pain level:  3/10 when woke up this morning, 1.5/10 currently [x] (09614) Provided verbal/tactile cueing for activities related to strengthening, flexibility, endurance, ROM  for improvements in proximal hip and core control with self care, mobility, lifting and ambulation. [x] (13051) Provided verbal/tactile cueing for activities related to improving balance, coordination, kinesthetic sense, posture, motor skill, proprioception  to assist with core control in self care, mobility, lifting, and ambulation. Therapeutic Activities:    [] (74320 or 99098) Provided verbal/tactile cueing for activities related to improving balance, coordination, kinesthetic sense, posture, motor skill, proprioception and motor activation to allow for proper function  with self care and ADLs  [] (68113) Provided training and instruction to the patient for proper core and proximal hip recruitment and positioning with ambulation re-education     Home Exercise Program:    [] (24986) Reviewed/Progressed HEP activities related to strengthening, flexibility, endurance, ROM of core, proximal hip and LE for functional self-care, mobility, lifting and ambulation   [] (41016) Reviewed/Progressed HEP activities related to improving balance, coordination, kinesthetic sense, posture, motor skill, proprioception of core, proximal hip and LE for self care, mobility, lifting, and ambulation      Access Code: [email or web address]   URL: [email or web address]   Date: 01/13/2021   Prepared by: Мария Hicks   Swiss Ball March - 10 reps - 2 sets - 1x daily - 7x weekly   Seated Pelvic Floor Contraction on Swiss Ball - 10 reps - 2 sets - 1x daily - 7x weekly   Alternating Shoulder Flexion Seated on Swiss Ball - 10 reps - 2 sets - 1x daily - 7x weekly   Seated March with Opposite Arm Flexion on Swiss Ball - 10 reps - 2 sets - 1x daily - 7x weekly     Manual Treatments:  PROM / STM / Oscillations-Mobs:  G-I, II, III, IV (PA's, Inf., Post.) [] (16037) Provided manual therapy to mobilize proximal hip and LS spine soft tissue/joints for the purpose of modulating pain, promoting relaxation,  increasing ROM, reducing/eliminating soft tissue swelling/inflammation/restriction, improving soft tissue extensibility and allowing for proper ROM for normal function with self care, mobility, lifting and ambulation. Modalities:       Charges:  Timed Code Treatment Minutes: 45 min   Total Treatment Minutes: 45 min       [] EVAL (LOW) 38580   [] EVAL (MOD) 09372   [] EVAL (HIGH) 72915   [] RE-EVAL     [x] EFRAIN(19920) x 2     [] IONTO  [x] NMR (15363) x 1     [] VASO  [] Manual (90842) x      [] Other:  [] TA x      [] Mech Traction (66122)  [] ES(attended) (69526)      [] ES (un) (71396):     Goals:  Patient stated goal:improve comfort especially when sleeping  [x] Progressing: [] Met: [] Not Met: [] Adjusted    Therapist goals for Patient:   Short Term Goals: To be achieved in: 2 weeks  1. Independent in HEP and progression per patient tolerance, in order to prevent re-injury. [] Progressing: [x] Met: [] Not Met: [] Adjusted  2. Patient will have a decrease in pain to facilitate improvement in movement, function, and ADLs as indicated by Functional Deficits. [] Progressing: [x] Met: [] Not Met: [] Adjusted      Long Term Goals: To be achieved in: 6 weeks  1. Disability index score of 5% or less for the Mod Oswestry  to assist with reaching prior level of function. [] Progressing: [] Met: [] Not Met: [] Adjusted  2. Patient will demonstrate increased AROM to WNL, good LS mobility, good hip ROM to allow for proper joint functioning as indicated by patients Functional Deficits. [] Progressing: [x] Met: [] Not Met: [] Adjusted  3. Patient will demonstrate an increase in Strength to good proximal hip and core activation to allow for proper functional mobility as indicated by patients Functional Deficits.    [] Progressing: [] Met: [] Not Met: [] Adjusted 4. Patient will be able turn and twist with ADL movement without increased symptoms or restriction. [x] Progressing: [] Met: [] Not Met: [] Adjusted  5. Pt will be able to sleep undisturbed by pain and without restrictive pain or tightness upon waking. [x] Progressing: [] Met: [] Not Met: [] Adjusted       Progression Towards Functional goals:  [x] Patient is progressing as expected towards functional goals listed. [] Progression is slowed due to complexities listed. [] Progression has been slowed due to co-morbidities. [] Plan just implemented, too soon to assess goals progression  [] Other:     Overall Progression Towards Functional goals/ Treatment Progress Update:  [x] Patient is progressing as expected towards functional goals listed. [] Progression is slowed due to complexities/Impairments listed. [] Progression has been slowed due to co-morbidities. [] Plan just implemented, too soon to assess goals progression <30days   [] Goals require adjustment due to lack of progress  [] Patient is not progressing as expected and requires additional follow up with physician  [] Other    Prognosis for POC: [x] Good [] Fair  [] Poor      Patient requires continued skilled intervention: [x] Yes  [] No    Treatment/Activity Tolerance:  [x] Patient able to complete treatment  [] Patient limited by fatigue  [] Patient limited by pain    [] Patient limited by other medical complications  [] Other:     ASSESSMENT: Despite pt's disappointment about a temporary return in symptoms, it god to see with getting back into her usual routine and sleeping surface quickly helped diminish it. I feel we are still on track for D/C next week. PLAN: See eval. Pt to be seen 2 times a week for 6 weeks.  Next visit: start preparing for D/C  [x] Continue per plan of care [] Alter current plan (see comments above)  [] Plan of care initiated [] Hold pending MD visit [] Discharge      Electronically signed by:  Chris Christianson PT Note: If patient does not return for scheduled/ recommended follow up visits, this note will serve as a discharge from care along with most recent update on progress.

## 2021-01-25 ENCOUNTER — HOSPITAL ENCOUNTER (OUTPATIENT)
Dept: PHYSICAL THERAPY | Age: 76
Setting detail: THERAPIES SERIES
Discharge: HOME | End: 2021-01-25
Payer: MEDICARE

## 2021-01-25 PROCEDURE — 97110 THERAPEUTIC EXERCISES: CPT

## 2021-01-25 NOTE — FLOWSHEET NOTE
The [facility] Orthopaedics and Sports Rehabilitation; [facility]         Physical Therapy Treatment Note/ Progress Report:           Date:  2021    Patient Name:  Rivera Meza    :  1945  MRN: [id number]  Restrictions/Precautions:    Medical/Treatment Diagnosis Information:  · Diagnosis: M47.816 spondylosis w/o radiculopathy, M51.36 lumbar DDD, M43.16 lumbar spondylolithesis  · Treatment Diagnosis: M54.5 LBP, M99.03 segmental & somatic dysfunction, M62.81 abdominal weakness  Insurance/Certification information:  PT Insurance Information: Aetna, no auth, med [facility], no telehealth  Physician Information:  Referring Practitioner: Dr. Kandace Duong  Has the plan of care been signed (Y/N):        []  Yes  [x]  No     Date of Patient follow up with Physician: as needed      Is this a Progress Report:     []  Yes  [x]  No        If Yes:  Date Range for reporting period:  Beginning 20  Ending    Progress report will be due (10 Rx or 30 days whichever is less):        Recertification will be due (POC Duration  / 90 days whichever is less): 2/10/21        Visit # Insurance Allowable Auth Required   In-person , 10/99 Med nec []  Yes [x]  No    Telehealth no Not covered []  Yes []  No    Total 6/16, 10/99           Functional Scale: Mod Oswestry: 10% disability   Date assessed:  20        Number of Comorbidities:  []0     [x]1-2    []3+    Latex Allergy:  [x]NO      []YES  Preferred Language for Healthcare:   [x]English       []other:     Pain level:  2/10 when woke up this morning, 0/10 currently    SUBJECTIVE: Pt says she definitely notices when she does and does not do her HEP because if she doesn't do them she gets more stiffness and soreness.  She does feel though overall 90% improved since starting PT.   OBJECTIVE:   ? Observation: P-A mobility WNL lumbar spine ? Test measurements: Forward flex 100% and equal mobility from all segments which is much improved from eval     RESTRICTIONS/PRECAUTIONS: osteopenia    Exercises/Interventions:     Therapeutic Ex (46289) Sets/Reps/ sec Notes/CUES   Cat/Camel HEP   Tailwag (quadruped side bending) HEP   Hooklying LE rotation HEP   Hooklying pelvic tilts HEP   TA leg circles HEP 1/6   TA SLR \"L\" HEP 1/6   S/L TA leg [place]  HEP 1/6   Clamshell w/ feet up HEP 1/6   Paloff press, 3 step put, 3 punches [id number], green   TA low row w/ step-up    Side stepping, TA Red 2x15 ft    Ref: TA, scap retract, row, low row, high row     Ref: DL lower 1R1B 2x10    Ref: leg circles 1R1B 2x10ea         Pt edu 5 min Use of HEP long-term and answering questions about it   Manual Intervention (39060)     STM: R paraspinals                              NMR re-education (12272)  CUES NEEDED   TA isometric  Edu, breathing   TA marching [id number] 1/4/21   TA knee fallout HEP 1/4   TA heel slide  HEP 1/4   TA theraball marching    TA theraball UE    TA theraball march + UE    DD on chair, TA marching    DD on chair, TA, UE    DD on chair, TA, heel raises    DD on chair, TA, march + UE    Tball pelvic tilts          Therapeutic Activity (62156)     Sit <>Stand with TA and pelvic floor engaged, overhead press                                  Therapeutic Exercise and NMR EXR  [x] (15827) Provided verbal/tactile cueing for activities related to strengthening, flexibility, endurance, ROM  for improvements in proximal hip and core control with self care, mobility, lifting and ambulation.  [] (91589) Provided verbal/tactile cueing for activities related to improving balance, coordination, kinesthetic sense, posture, motor skill, proprioception  to assist with core control in self care, mobility, lifting, and ambulation.      Therapeutic Activities: [] RE-EVAL     [x] BA(09217) x 3     [] IONTO  [] NMR (92069) x      [] VASO  [] Manual (37049) x      [] Other:  [] TA x      [] Mech Traction (54149)  [] ES(attended) (57944)      [] ES (un) (86445):     Goals:  Patient stated goal:improve comfort especially when sleeping  [x] Progressing: [] Met: [] Not Met: [] Adjusted    Therapist goals for Patient:   Short Term Goals: To be achieved in: 2 weeks  1. Independent in HEP and progression per patient tolerance, in order to prevent re-injury. [] Progressing: [x] Met: [] Not Met: [] Adjusted  2. Patient will have a decrease in pain to facilitate improvement in movement, function, and ADLs as indicated by Functional Deficits. [] Progressing: [x] Met: [] Not Met: [] Adjusted      Long Term Goals: To be achieved in: 6 weeks  1. Disability index score of 5% or less for the Mod Oswestry  to assist with reaching prior level of function. [] Progressing: [] Met: [] Not Met: [] Adjusted  2. Patient will demonstrate increased AROM to WNL, good LS mobility, good hip ROM to allow for proper joint functioning as indicated by patients Functional Deficits. [] Progressing: [x] Met: [] Not Met: [] Adjusted  3. Patient will demonstrate an increase in Strength to good proximal hip and core activation to allow for proper functional mobility as indicated by patients Functional Deficits. [] Progressing: [] Met: [] Not Met: [] Adjusted  4. Patient will be able turn and twist with ADL movement without increased symptoms or restriction. [] Progressing: [x] Met: [] Not Met: [] Adjusted  5. Pt will be able to sleep undisturbed by pain and without restrictive pain or tightness upon waking. [x] Progressing: [] Met: [] Not Met: [] Adjusted       Progression Towards Functional goals:  [x] Patient is progressing as expected towards functional goals listed. [] Progression is slowed due to complexities listed. [] Progression has been slowed due to co-morbidities. [] Plan just implemented, too soon to assess goals progression  [] Other:     Overall Progression Towards Functional goals/ Treatment Progress Update:  [x] Patient is progressing as expected towards functional goals listed. [] Progression is slowed due to complexities/Impairments listed. [] Progression has been slowed due to co-morbidities. [] Plan just implemented, too soon to assess goals progression <30days   [] Goals require adjustment due to lack of progress  [] Patient is not progressing as expected and requires additional follow up with physician  [] Other    Prognosis for POC: [x] Good [] Fair  [] Poor      Patient requires continued skilled intervention: [x] Yes  [] No    Treatment/Activity Tolerance:  [x] Patient able to complete treatment  [] Patient limited by fatigue  [] Patient limited by pain    [] Patient limited by other medical complications  [] Other:     ASSESSMENT: Pt is showing good understanding and independence with HEPs and has mostly resolved her symptoms. She has also found that if she has episode of pain and tightness she is able to control it on her own through HEP. Therefore pt likely ready for D/C next visit. PLAN: See eval. Pt to be seen 2 times a week for 6 weeks. Next visit: D/C  [x] Continue per plan of care [] Alter current plan (see comments above)  [] Plan of care initiated [] Hold pending MD visit [] Discharge      Electronically signed by:  Lupillo Bertrand PT    Note: If patient does not return for scheduled/ recommended follow up visits, this note will serve as a discharge from care along with most recent update on progress.

## 2021-01-27 ENCOUNTER — HOSPITAL ENCOUNTER (OUTPATIENT)
Dept: PHYSICAL THERAPY | Age: 76
Setting detail: THERAPIES SERIES
Discharge: HOME | End: 2021-01-27
Payer: MEDICARE

## 2021-01-27 PROCEDURE — 97110 THERAPEUTIC EXERCISES: CPT

## 2021-01-27 NOTE — DISCHARGE SUMMARY
The [facility] Orthopaedics and [facility]; [address] and Rehabilitation, [facility]  [address] [place] [address], [address] [place] [address]   Hans Cabrera  Phone: [phone]  Fax [phone]       Physical Therapy Discharge  Date: 2021        Patient Name:  Royal Sy DUNNB:  1945  MRN: [id number]  Medical/Treatment Diagnosis Information:  · Diagnosis: M47.816 spondylosis w/o radiculopathy, M51.36 lumbar DDD, M43.16 lumbar spondylolithesis  · Treatment Diagnosis: M54.5 LBP, M99.03 segmental & somatic dysfunction, M62.81 abdominal weakness  Insurance/Certification information:  PT Insurance Information: Aetna, no auth, med [facility],  telehealth  Physician Information:  Referring Practitioner: Dr. Chani Borden  [] Surgical [x] Conservative     Number of Comorbidities:  []0     [x]1-2    []3+  Total number of visits: 7  Reporting Period:   Beginning Date:20   End Date:21    OBJECTIVE  Test used Initial score Discharge Score   Pain Summary VAS 3/10 0/10   Functional questionnaire Mod Oswestry 10% disability 2% disability        ROM  EVAL   21   LUMBAR FLEX 90%   100%   LUMBAR % pain   100%   Sidebend L 75% pain R side R 100% L 100% pain free R 100%   Rotation 50% 50% 100% 100%   Strength  LEFT RIGHT Left  21 Right 21   MfA good good good    TrA poor   good   HIP Flexors 5 4+ 5/5 5/5   HIP Abductors 5 5     HIP ER 5 5     Hip IR        Knee EXT (quad) 5 5     Knee Flex (HS) 5 5     Ankle DF 5 5     Ankle PF 5 5     Great Toe Ext 5 5            Treatment to date:  [x] Therapeutic Exercise    [] Modalities:  [] Therapeutic Activity             []Ultrasound            []Electrical Stimulation  [] Gait Training     []Cervical Traction    [] Lumbar Traction  [x] Neuromuscular Re-education [] Cold/hotpack         []Iontophoresis  [x] Instruction in HEP      Other:  [x] Manual Therapy                   [] []   Assessment:   [x] All Goals were achieved.  [] The following goals were achieved (#'s):   [] The following goals were not achieved for the following reasons:/assessmen of improvement as it relates to each goal:    Plan of Care:  [x] Discharge from Therapy Services due to:goals met and indep with HEP  Reason for Discharge:   [x] All goals achieved    [] Patient having surgery  [] Physician discontinued therapy  [] Insurance/Financial Limitations [] Patient did not return for follow up visits [] Home program/1 visit only   [] No subjective or objective improvement [] Plateaued   [] Patient was unable to adhere to the plan of care established at evaluation. [] Referred back to physician for re-evaluation and did not return.      [] Other:       Electronically signed by:  Amy Francois, PT      Physical Therapy Treatment Note/ Progress Report:           Date:  2021    Patient Name:  Rivera Meza    :  1945  MRN: [id number]  Restrictions/Precautions:    Medical/Treatment Diagnosis Information:  · Diagnosis: M47.816 spondylosis w/o radiculopathy, M51.36 lumbar DDD, M43.16 lumbar spondylolithesis  · Treatment Diagnosis: M54.5 LBP, M99.03 segmental & somatic dysfunction, M62.81 abdominal weakness  Insurance/Certification information:  PT Insurance Information: Aetna, no auth, [facility], no telehealth  Physician Information:  Referring Practitioner: Dr. Kandace Duong  Has the plan of care been signed (Y/N):        []  Yes  [x]  No     Date of Patient follow up with Physician: as needed      Is this a Progress Report:     []  Yes  [x]  No        If Yes:  Date Range for reporting period:  Beginning 20  Ending    Progress report will be due (10 Rx or 30 days whichever is less):        Recertification will be due (POC Duration  / 90 days whichever is less): 2/10/21        Visit # Insurance Allowable Auth Required   In-person  Med nec []  Yes [x]  No Telehealth no Not covered []  Yes []  No    Total 7/16, 11/99           Functional Scale: Mod Oswestry: 10% disability   Date assessed:  12/30/20              Mod Oswestry: 5% disability   Date assessed: 1/27/21  Number of Comorbidities:  []0     [x]1-2    []3+    Latex Allergy:  [x]NO      []YES  Preferred Language for Healthcare:   [x]English       []other:     Pain level:  2/10 when woke up this morning, 0/10 currently    SUBJECTIVE: Pt overall feels 90-95% improved since doing PT. Pt says she no longer gets the sharp twinges like she used to and can twist and lift around the house without pain, pt has less pain sleeping and while she still feels a little stiffness when she wakes up it works out much more easily and quickly. OBJECTIVE:    Observation: P-A mobility WNL lumbar spine   Test measurements:   Forward flex 100% and equal mobility from all segments which is much improved   from eval     OBJECTIVE:      ROM  EVAL   1/27/21   LUMBAR FLEX 90%   100%   LUMBAR % pain   100%   Sidebend L 75% pain R side R 100% L 100% pain free R 100%   Rotation 50% 50% 100% 100%   Strength  LEFT RIGHT Left  1/27/21 Right 1/27/21   MfA good good good    TrA poor   good   HIP Flexors 5 4+ 5/5 5/5   HIP Abductors 5 5     HIP ER 5 5     Hip IR        Knee EXT (quad) 5 5     Knee Flex (HS) 5 5     Ankle DF 5 5     Ankle PF 5 5     Great Toe Ext 5 5          RESTRICTIONS/PRECAUTIONS: osteopenia    Exercises/Interventions:     Therapeutic Ex (99692) Sets/Reps/ sec Notes/CUES   Cat/Camel HEP   Tailwag (quadruped side bending) HEP   Hooklying LE rotation HEP   Hooklying pelvic tilts HEP   TA leg circles HEP 1/6   TA SLR \"L\" HEP 1/6   S/L TA leg [place]  HEP 1/6   Clamshell w/ feet up HEP 1/6   Paloff press, 3 step put, 3 punches 1x8x3 steps greenNo punches because bothering L shoulder with PMHx of RTC   TA low row w/ step-up 10\" green 1x10 R/L   Side stepping, TA     Ref: TA, scap retract, row, low row, high row     Ref: DL lower 1R1B 2x10    Ref: leg circles 1R1B 2x10ea         Pt edu 5 min Use of HEP long-term and answering questions about it   Manual Intervention ([phone])     STM: R paraspinals                              NMR re-education (80166)  CUES NEEDED   TA isometric  Edu, breathing   TA marching [id number] 1/4/21   TA knee fallout HEP 1/4   TA heel slide  HEP 1/4   TA theraball marching    TA theraball UE    TA theraball march + UE    DD on chair, TA marching    DD on chair, TA, UE    DD on chair, TA, heel raises    DD on chair, TA, march + UE    Tball pelvic tilts          Therapeutic Activity (04960)     Sit <>Stand with TA and pelvic floor engaged, overhead press                                  Therapeutic Exercise and NMR EXR  [x] (26462) Provided verbal/tactile cueing for activities related to strengthening, flexibility, endurance, ROM  for improvements in proximal hip and core control with self care, mobility, lifting and ambulation.  [] (65034) Provided verbal/tactile cueing for activities related to improving balance, coordination, kinesthetic sense, posture, motor skill, proprioception  to assist with core control in self care, mobility, lifting, and ambulation.      Therapeutic Activities:    [] (49069 or 58829) Provided verbal/tactile cueing for activities related to improving balance, coordination, kinesthetic sense, posture, motor skill, proprioception and motor activation to allow for proper function  with self care and ADLs  [] (89497) Provided training and instruction to the patient for proper core and proximal hip recruitment and positioning with ambulation re-education     Home Exercise Program:    [x] (96471) Reviewed/Progressed HEP activities related to strengthening, flexibility, endurance, ROM of core, proximal hip and LE for functional self-care, mobility, lifting and ambulation   [] (85077) Reviewed/Progressed HEP activities related to improving balance, coordination, kinesthetic sense, posture, motor skill, proprioception of core, proximal hip and LE for self care, mobility, lifting, and ambulation      Access Code: [email or web address]   URL: [email or web address]   Date: 01/13/2021   Prepared by: Katia Hicks   Swiss Ball March - 10 reps - 2 sets - 1x daily - 7x weekly   Seated Pelvic Floor Contraction on Swiss Ball - 10 reps - 2 sets - 1x daily - 7x weekly   Alternating Shoulder Flexion Seated on Swiss Ball - 10 reps - 2 sets - 1x daily - 7x weekly   Seated March with Opposite Arm Flexion on Swiss Ball - 10 reps - 2 sets - 1x daily - 7x weekly     Manual Treatments:  PROM / STM / Oscillations-Mobs:  G-I, II, III, IV (PA's, Inf., Post.)  [] (82571) Provided manual therapy to mobilize proximal hip and LS spine soft tissue/joints for the purpose of modulating pain, promoting relaxation,  increasing ROM, reducing/eliminating soft tissue swelling/inflammation/restriction, improving soft tissue extensibility and allowing for proper ROM for normal function with self care, mobility, lifting and ambulation. Modalities:       Charges:  Timed Code Treatment Minutes: 48 min   Total Treatment Minutes: 48 min       [] EVAL (LOW) 69969   [] EVAL (MOD) 70521   [] EVAL (HIGH) 80488   [] RE-EVAL     [x] GE(81145) x 3     [] IONTO  [] NMR (90055) x      [] VASO  [] Manual (29049) x      [] Other:  [] TA x      [] Mech Traction (42510)  [] ES(attended) (04896)      [] ES (un) (55338):     Goals:  Patient stated goal:improve comfort especially when sleeping  [x] Progressing: [] Met: [] Not Met: [] Adjusted    Therapist goals for Patient:   Short Term Goals: To be achieved in: 2 weeks  1. Independent in HEP and progression per patient tolerance, in order to prevent re-injury. [] Progressing: [x] Met: [] Not Met: [] Adjusted  2. Patient will have a decrease in pain to facilitate improvement in movement, function, and ADLs as indicated by Functional Deficits.   [] Progressing: [x] Met: [] Not Met: [] Adjusted      Long Term Goals: To be achieved in: 6 weeks  1. Disability index score of 5% or less for the Mod Oswestry  to assist with reaching prior level of function. [] Progressing: [x] Met: [] Not Met: [] Adjusted  2. Patient will demonstrate increased AROM to WNL, good LS mobility, good hip ROM to allow for proper joint functioning as indicated by patients Functional Deficits. [] Progressing: [x] Met: [] Not Met: [] Adjusted  3. Patient will demonstrate an increase in Strength to good proximal hip and core activation to allow for proper functional mobility as indicated by patients Functional Deficits. [] Progressing: [x] Met: [] Not Met: [] Adjusted  4. Patient will be able turn and twist with ADL movement without increased symptoms or restriction. [] Progressing: [x] Met: [] Not Met: [] Adjusted  5. Pt will be able to sleep undisturbed by pain and without restrictive pain or tightness upon waking. [] Progressing: [x] Met: [] Not Met: [] Adjusted       Progression Towards Functional goals:  [x] Patient is progressing as expected towards functional goals listed. [] Progression is slowed due to complexities listed. [] Progression has been slowed due to co-morbidities. [] Plan just implemented, too soon to assess goals progression  [] Other:     Overall Progression Towards Functional goals/ Treatment Progress Update:  [x] Patient is progressing as expected towards functional goals listed. [] Progression is slowed due to complexities/Impairments listed. [] Progression has been slowed due to co-morbidities.   [] Plan just implemented, too soon to assess goals progression <30days   [] Goals require adjustment due to lack of progress  [] Patient is not progressing as expected and requires additional follow up with physician  [] Other    Prognosis for POC: [x] Good [] Fair  [] Poor      Patient requires continued skilled intervention: [x] Yes  [] No    Treatment/Activity Tolerance:  [x] Patient able to complete treatment  [] Patient limited by fatigue  [] Patient limited by pain    [] Patient limited by other medical complications  [] Other:     ASSESSMENT: Pt has shown good improvment in PT with improved pain free full ROM, improved flexibility, Improved core strength and awareness of posture and stabilization. She is independent with HEP and ready for D/C. She is happy with her outcome. PLAN: See eval. Pt to be seen 2 times a week for 6 weeks. Next visit: D/C  [x] Continue per plan of care [] Alter current plan (see comments above)  [] Plan of care initiated [] Hold pending MD visit [] Discharge      Electronically signed by:  Harriet Aviles PT    Note: If patient does not return for scheduled/ recommended follow up visits, this note will serve as a discharge from care along with most recent update on progress.

## 2024-11-11 NOTE — PROGRESS NOTES
Examination-probably a good range of motion-bilateral.  Negative straight leg raise examination-bilateral.      Diagnostic Testing: The following x rays were read and interpreted by myself      1.  3 x-ray views of the LEFT. 3 x-ray views of the right knee were obtained. The patient has moderate narrowing medial joint space and mild to moderate narrowing of the patellofemoral joint space. There is also an old lateral patellar facet fracture on the LEFT side. Orders     Orders Placed This Encounter   Procedures    XR KNEE LEFT (3 VIEWS)    XR KNEE RIGHT (3 VIEWS)         Assessment / Treatment Plan:     1. Osteoarthritis of the knees palmarly is patellofemoral.I discussed with the patient the nature of osteoarthritis of the knee. We talked about treatment of arthritis and the various options that are involved with this. The patient understands that the treatments can vary from essentially doing nothing to a total joint replacement arthroplasty for arthritis. I then went on to describe the utilization of glucosamine and chondroitin sulfate as a joint nutrition product. We talked about the fact that this is essentially a joint vitamin with typically minimal side effects. We also talked about utilization of prescription over-the-counter anti-inflammatory medications as the next option. We also discussed the possibility of brace wear or orthotic wear if the patient has significant varus alignment. We then went on to discuss the possibility of Visco supplementation with hyaluronate products. We talked about the typical course of this type of treatment and the fact that often times in the treatment for significant arthritis, this is successful less than half the time. We also talked about the corticosteroid injections and the fact that this can give a brief window of relief, but does not cure the problem; in fact, the pain often has a rebound effect in 6-10 weeks after the steroid has worn off.  We also Hide Additional Notes?: No Detail Level: Detailed Detail Level: Zone